# Patient Record
Sex: FEMALE | Race: WHITE | NOT HISPANIC OR LATINO | Employment: OTHER | URBAN - METROPOLITAN AREA
[De-identification: names, ages, dates, MRNs, and addresses within clinical notes are randomized per-mention and may not be internally consistent; named-entity substitution may affect disease eponyms.]

---

## 2017-09-22 ENCOUNTER — TELEPHONE (OUTPATIENT)
Dept: LAB | Facility: OTHER | Age: 62
End: 2017-09-22

## 2017-09-22 DIAGNOSIS — E78.5 HYPERLIPIDEMIA LDL GOAL <160: Primary | ICD-10-CM

## 2017-09-22 NOTE — TELEPHONE ENCOUNTER
Please enter future fasting lab orders for upcoming lab appointment, (pt. Also requesting Hep. C screening)    Thanks Jess

## 2017-09-22 NOTE — TELEPHONE ENCOUNTER
Lipid and glucose pended. Please let patient know that Hep C was completed last year and was negative. This does not need to be repeated.     Jamie Heck PA-C  [facility]

## 2017-09-22 NOTE — TELEPHONE ENCOUNTER
Lab notified order placed- and Lm for patient to call us back letting her know that does not need Hep C due to was done last year and was negative.

## 2017-09-27 ENCOUNTER — RADIANT APPOINTMENT (OUTPATIENT)
Dept: MAMMOGRAPHY | Facility: OTHER | Age: 62
End: 2017-09-27
Payer: COMMERCIAL

## 2017-09-27 DIAGNOSIS — Z12.31 VISIT FOR SCREENING MAMMOGRAM: ICD-10-CM

## 2017-09-27 DIAGNOSIS — E78.5 HYPERLIPIDEMIA LDL GOAL <160: ICD-10-CM

## 2017-09-27 LAB
CHOLEST SERPL-MCNC: 246 MG/DL
GLUCOSE SERPL-MCNC: 104 MG/DL (ref 70–99)
HDLC SERPL-MCNC: 79 MG/DL
LDLC SERPL CALC-MCNC: 151 MG/DL
NONHDLC SERPL-MCNC: 167 MG/DL
TRIGL SERPL-MCNC: 80 MG/DL

## 2017-09-27 PROCEDURE — 36415 COLL VENOUS BLD VENIPUNCTURE: CPT | Performed by: PHYSICIAN ASSISTANT

## 2017-09-27 PROCEDURE — G0202 SCR MAMMO BI INCL CAD: HCPCS | Mod: TC

## 2017-09-27 PROCEDURE — 80061 LIPID PANEL: CPT | Performed by: PHYSICIAN ASSISTANT

## 2017-09-27 PROCEDURE — 82947 ASSAY GLUCOSE BLOOD QUANT: CPT | Performed by: PHYSICIAN ASSISTANT

## 2017-09-29 ENCOUNTER — HEALTH MAINTENANCE LETTER (OUTPATIENT)
Age: 62
End: 2017-09-29

## 2017-10-10 NOTE — PROGRESS NOTES
SUBJECTIVE:   CC: Mirtha Marroquin is an 62 year old woman who presents for preventive health visit.     Physical   Annual:     Getting at least 3 servings of Calcium per day::  Yes    Bi-annual eye exam::  Yes    Dental care twice a year::  Yes    Sleep apnea or symptoms of sleep apnea::  None    Diet::  Regular (no restrictions)    Taking medications regularly::  Yes    Additional concerns today::  No    1. Ear pain, off and on for a few months  [facility], was given drops and helped but then left started hurting, especially when using ear plugs at work  - Has been using hydrocortisone on a q-tip which helps     2. Labs drawn 9/27/17   - Results     3. Yellowing and flaking of nails on big toe, spreading to 2nd toe on left  - Wears work boots   - Always bare foot at home   - Takes hot bath each night after work   - Hasn't tried anything OTC  - Been ongoing for year     4. Skin tag on left eyelid  - Bothers her   - Can't put on make up   - Had several removed from her lips in the past (frozen)         Today's PHQ-2 Score: PHQ-2 ( 1999 Pfizer) 9/22/2016   Q1: Little interest or pleasure in doing things 0   Q2: Feeling down, depressed or hopeless 0   PHQ-2 Score 0     Answers for HPI/ROS submitted by the patient on 10/16/2017   PHQ-2 Score: 0    Abuse: Current or Past(Physical, Sexual or Emotional)- No  Do you feel safe in your environment - Yes    Social History   Substance Use Topics     Smoking status: Former Smoker     Packs/day: 0.80     Years: 20.00     Types: Cigarettes     Quit date: 8/9/2009     Smokeless tobacco: Never Used     Alcohol use Yes      Comment: not much, beer once a month     The patient does not drink >3 drinks per day nor >7 drinks per week.    Reviewed orders with patient.  Reviewed health maintenance and updated orders accordingly - Yes  Labs reviewed in EPIC  BP Readings from Last 3 Encounters:   10/16/17 112/84   09/22/16 112/84   07/07/16 122/78    Wt  Readings from Last 3 Encounters:   10/16/17 227 lb (103 kg)   09/22/16 223 lb (101.2 kg)   07/07/16 228 lb 8 oz (103.6 kg)               The 10-year ASCVD risk score (Kandi MENDEZ Jr, et al., 2013) is: 3.3%    Values used to calculate the score:      Age: 62 years      Sex: Female      Is Non- : No      Diabetic: No      Tobacco smoker: No      Systolic Blood Pressure: 112 mmHg      Is BP treated: No      HDL Cholesterol: 79 mg/dL      Total Cholesterol: 246 mg/dL    Patient over age 50, mutual decision to screen reflected in health maintenance.      Pertinent mammograms are reviewed under the imaging tab.  History of abnormal Pap smear: NO - age 30-65 PAP every 5 years with negative HPV co-testing recommended    Reviewed and updated as needed this visit by clinical staffTobacco  Allergies  Meds  Problems  Med Hx  Surg Hx  Fam Hx  Soc Hx          Reviewed and updated as needed this visit by Provider  Allergies  Meds  Problems        Past Medical History:   Diagnosis Date     Abnormal weight gain 10/2009     Absence of menstruation 2002    LMP 2002     Chronic rhinitis      Dyspepsia and other specified disorders of function of stomach 5/2006     Osteopenia      Pure hypercholesterolemia     worse 2009     Tobacco use disorder     quit 2008      Past Surgical History:   Procedure Laterality Date     C DEXA INTERPRETATION, AXIAL  10/2004    WNL 12/2007 min change     C NONSPECIFIC PROCEDURE  1974    Gastric bypass     COLONOSCOPY  11/30/07    [facility]-repeat in 5yrs     COLONOSCOPY  12/17/12     [facility] COLONOSCOPY W/WO BRUSH/WASH  10/5/2004    tubular adenoma - repeat 3 years per pt (?)     HC REMV CATARACT EXTRACAP,INSERT LENS  1993       ROS:  C: NEGATIVE for fever, chills, change in weight  I: NEGATIVE for worrisome rashes, moles or lesions  E: NEGATIVE for vision changes or irritation  ENT: NEGATIVE for ear, mouth and throat problems  R: NEGATIVE for significant cough or SOB  B:  "NEGATIVE for masses, tenderness or discharge  CV: NEGATIVE for chest pain, palpitations or peripheral edema  GI: NEGATIVE for nausea, abdominal pain, heartburn, or change in bowel habits  : NEGATIVE for unusual urinary or vaginal symptoms. No vaginal bleeding.  M: NEGATIVE for significant arthralgias or myalgia  N: NEGATIVE for weakness, dizziness or paresthesias  P: NEGATIVE for changes in mood or affect      OBJECTIVE:   /84 (BP Location: Right arm, Patient Position: Chair, Cuff Size: Adult Regular)  Pulse 76  Temp 98.4  F (36.9  C) (Oral)  Resp 12  Ht 5' 4.88\" (1.648 m)  Wt 227 lb (103 kg)  LMP 03/15/2001  SpO2 98%  BMI 37.91 kg/m2  EXAM:  GENERAL APPEARANCE: healthy, alert and no distress  EYES: Eyes grossly normal to inspection, PERRL and conjunctivae and sclerae normal  HENT:Bilateral ear canals slightly erythematous with some scaling/white discharge, bilateral TM's normal, nose and mouth without ulcers or lesions, oropharynx clear and oral mucous membranes moist  NECK: no adenopathy, no asymmetry, masses, or scars and thyroid normal to palpation  RESP: lungs clear to auscultation - no rales, rhonchi or wheezes  BREAST: normal without masses, tenderness or nipple discharge and no palpable axillary masses or adenopathy  CV: regular rate and rhythm, normal S1 S2, no S3 or S4, no murmur, click or rub, no peripheral edema and peripheral pulses strong  ABDOMEN: soft, nontender, no hepatosplenomegaly, no masses and bowel sounds normal   (female): normal female external genitalia, normal urethral meatus, vaginal mucosal atrophy noted, normal cervix, adnexae, and uterus without masses or abnormal discharge  MS: no musculoskeletal defects are noted and gait is age appropriate without ataxia  SKIN: Skin tag on left upper eyelid 5 mm long with stalk        Bilateral toes - left 1st and 2nd nails, right 1st nails, thickened with yellowing        no other suspicious lesions or rashes  NEURO: Normal " strength and tone, sensory exam grossly normal, mentation intact and speech normal  PSYCH: mentation appears normal and affect normal/bright      Diagnostics  Results for orders placed or performed in visit on 09/27/17   MA Screening Digital Bilateral    Narrative    SCREENING MAMMOGRAM, BILATERAL, DIGITAL w/CAD - 9/27/2017 10:37 AM    BREAST SYMPTOMS: No current breast complaints.     COMPARISON:  10/31/2016, 10/19/2015, 12/01/2014, 11/04/2013 .    BREAST DENSITY: Scattered fibroglandular densities.    COMMENTS: No findings of suspicion for malignancy.       Impression    IMPRESSION: BI-RADS CATEGORY: 1 -  Negative    RECOMMENDED FOLLOW-UP: Annual Mammography.    Exam results letter mailed to patient.      JAMEY BADILLO MD     Results for DOMINGO JIMENEZ (MRN [id number]) as of 10/16/2017 10:42   Ref. Range 9/27/2017 09:56   Cholesterol Latest Ref Range: <200 mg/dL 246 (H)   HDL Cholesterol Latest Ref Range: >49 mg/dL 79   LDL Cholesterol Calculated Latest Ref Range: <100 mg/dL 151 (H)   Non HDL Cholesterol Latest Ref Range: <130 mg/dL 167 (H)   Triglycerides Latest Ref Range: <150 mg/dL 80   Glucose Latest Ref Range: 70 - 99 mg/dL 104 (H)         Procedure note:   Procedure was discussed with patient. Site(s) as described above were identified. Small amount of liquid nitrogen was poured from the canister into cup. Sterile swab was dipped into liquid nitrogen. The skin tag was pulled away from eyelid using forceps. The sterile swab was held against the stalk only of the skin tag for 2-3 seconds. 3 freeze-thaw cycles were given. A total of 1 skin tags were treated          ASSESSMENT/PLAN:       ICD-10-CM    1. Encounter for routine adult health examination without abnormal findings Z00.00    2. Hyperlipidemia LDL goal <160 E78.5    3. Screening for malignant neoplasm of cervix Z12.4 Pap imaged thin layer screen with HPV - recommended age 30 - 65 years (select HPV order below)     HPV High Risk Types DNA  Cervical   4. Screening for colon cancer Z12.11 GASTROENTEROLOGY ADULT REF PROCEDURE ONLY   5. Counseling regarding advanced directives Z71.89 HONORING CHOICES REFERRAL   6. Other infective acute otitis externa of both ears H60.393 ciprofloxacin-dexamethasone (CIPRODEX) otic suspension     OFFICE/OUTPT VISIT,EST,LEVL III   7. Tinea pedis of both feet B35.3 OFFICE/OUTPT VISIT,EST,LEVL III   8. Skin tag L91.8 OFFICE/OUTPT VISIT,EST,LEVL III     REMOVAL OF SKIN TAGS, FIRST 15   9. BMI >35 (H) E66.01      2. Lipids   - Monitoring yearly, no medications, patient states last year was taking fish oil consistently, but not so much this year  - ASCVD this year only at 3.3%   - Advised patient on lifestyle changes, diet, exercise, and weight loss  - Also advised taking Fish Oil consistently and discussed correct dosage (see patient instructions)   - Recheck yearly     3. Pap collected today, await results, may d/c paps if normal     4. Patient gets colonoscopies at [facility] in [place] as she lives in [facility] in December for her next one      Order placed     5. Discussed and gave hand out on this     6. Otitis Externa  - Not clear if inflammation vs irritative vs infectious   - Likely due to her ear plug use, no alternatives available as she is a  and uses a valdez that needs to go over her head   - Will do trial of Cipro Dex to clear possible infection and steroid to calm down, can repeat every 3 weeks, can continue with small amount of hydrocortisone on q-tip  - Patient planning on retiring next year so shouldn't be an issue much longer, will likely resolve when she stops using ear plugs     7. Dermatophytosis of nails on feet    - Mild case, no itching, just yellowing of nails   - Recommend OTC Lotrimin or like product, twice daily into nails  - Also extensively discussed good food care with nail trimming and keeping feet dry      Recommend boot dryer, inserts, powders to keep boots and feet dry during work  "  - Return to clinic if persists     8. Skin tag  - Treated on left upper eyelid   - Cryotherapy was very carefully applied just to stalk and no frost rim was seen going onto eyelid   - Prior to removal, discussed with patient can't use lidocaine or spray cryotherapy due to sensitive location   - Discussed this way of treating is safer, hopefully the skin tag will shrink and fall off, still discussed possible risks of blistering and infection  - Wash daily carefully with normal facial skin soap   - If doesn't resolve, may just have to freeze stalk again and use iris scissors to snip off   - After care discussed     - Discussed mammograms now every 2 years due to no family history and many years of normal mammograms           COUNSELING:  Reviewed preventive health counseling, as reflected in patient instructions  Special attention given to:        Regular exercise       Healthy diet/nutrition       Advance Care Planning     reports that she quit smoking about 8 years ago. Her smoking use included Cigarettes. She has a 16.00 pack-year smoking history. She has never used smokeless tobacco.    Estimated body mass index is 37.2 kg/(m^2) as calculated from the following:    Height as of 5/23/16: 5' 4.92\" (1.649 m).    Weight as of 9/22/16: 223 lb (101.2 kg).   Weight management plan: Discussed healthy diet and exercise guidelines and patient will follow up in 12 months in clinic to re-evaluate.    Counseling Resources:  ATP IV Guidelines  Pooled Cohorts Equation Calculator  Breast Cancer Risk Calculator  FRAX Risk Assessment  ICSI Preventive Guidelines  Dietary Guidelines for Americans, 2010  USDA's MyPlate  ASA Prophylaxis  Lung CA Screening      In addition to the preventive visit, 20 minutes of the appointment were spent evaluating and developing a treatment plan for her additional concern(s).        Jeni Heck PA-C  [facility]"

## 2017-10-16 ENCOUNTER — OFFICE VISIT (OUTPATIENT)
Dept: FAMILY MEDICINE | Facility: OTHER | Age: 62
End: 2017-10-16
Payer: COMMERCIAL

## 2017-10-16 VITALS
RESPIRATION RATE: 12 BRPM | TEMPERATURE: 98.4 F | SYSTOLIC BLOOD PRESSURE: 112 MMHG | BODY MASS INDEX: 37.82 KG/M2 | OXYGEN SATURATION: 98 % | WEIGHT: 227 LBS | DIASTOLIC BLOOD PRESSURE: 84 MMHG | HEART RATE: 76 BPM | HEIGHT: 65 IN

## 2017-10-16 DIAGNOSIS — H60.393 OTHER INFECTIVE ACUTE OTITIS EXTERNA OF BOTH EARS: ICD-10-CM

## 2017-10-16 DIAGNOSIS — L91.8 SKIN TAG: ICD-10-CM

## 2017-10-16 DIAGNOSIS — Z12.4 SCREENING FOR MALIGNANT NEOPLASM OF CERVIX: ICD-10-CM

## 2017-10-16 DIAGNOSIS — B35.3 TINEA PEDIS OF BOTH FEET: ICD-10-CM

## 2017-10-16 DIAGNOSIS — E78.5 HYPERLIPIDEMIA LDL GOAL <160: ICD-10-CM

## 2017-10-16 DIAGNOSIS — Z12.11 SCREENING FOR COLON CANCER: ICD-10-CM

## 2017-10-16 DIAGNOSIS — Z00.00 ENCOUNTER FOR ROUTINE ADULT HEALTH EXAMINATION WITHOUT ABNORMAL FINDINGS: Primary | ICD-10-CM

## 2017-10-16 DIAGNOSIS — Z71.89 COUNSELING REGARDING ADVANCED DIRECTIVES: ICD-10-CM

## 2017-10-16 DIAGNOSIS — E66.01 MORBID OBESITY (H): ICD-10-CM

## 2017-10-16 PROCEDURE — G0145 SCR C/V CYTO,THINLAYER,RESCR: HCPCS | Performed by: PHYSICIAN ASSISTANT

## 2017-10-16 PROCEDURE — 99213 OFFICE O/P EST LOW 20 MIN: CPT | Mod: 25 | Performed by: PHYSICIAN ASSISTANT

## 2017-10-16 PROCEDURE — 11200 RMVL SKIN TAGS UP TO&INC 15: CPT | Performed by: PHYSICIAN ASSISTANT

## 2017-10-16 PROCEDURE — 87624 HPV HI-RISK TYP POOLED RSLT: CPT | Performed by: PHYSICIAN ASSISTANT

## 2017-10-16 PROCEDURE — 99396 PREV VISIT EST AGE 40-64: CPT | Mod: 25 | Performed by: PHYSICIAN ASSISTANT

## 2017-10-16 RX ORDER — CIPROFLOXACIN AND DEXAMETHASONE 3; 1 MG/ML; MG/ML
4 SUSPENSION/ DROPS AURICULAR (OTIC) 2 TIMES DAILY
Qty: 7.5 ML | Refills: 0 | Status: SHIPPED | OUTPATIENT
Start: 2017-10-16 | End: 2017-10-23

## 2017-10-16 NOTE — PATIENT INSTRUCTIONS
- OTC Lotrimin (anti-fungal) for feet     - Ciprodex drops - 4 drops twice a day for 7 days, then stop for 2 weeks, if returns can repeat every 3 weeks         I recommend that you take Omega-3 fatty acids (available in capsules) to improve your cholesterol. You need 2000 - 4000 mg daily of EPA + DHA. This usually means 4 - 8 capsules a day, depending on the strength you buy OR Or you can try taking red yeast rice (an herbal supplement that contains a natural statin) start with 600 mg once daily and increase to 1200 mg twice daily as tolerated.     As you may know, abnormal cholesterol is one factor that increases your risk for heart disease and stroke. You can improve your cholesterol by controlling the amount and type of fat you eat and by increasing your daily activity level.    Here are some ways to improve your nutrition (adapted from the American Academy of Family Practice handout):  Eat less fat (especially butter, Crisco and other saturated fats)  Buy lean cuts of meat; reduce your portions of red meat or substitute poultry or fish  Use skim milk and low-fat dairy products  Eat no more than 4 egg yolks per week  Avoid fried or fast foods that are high in fat  Eat more fruits and vegetables    Also consider starting or increasing your aerobic activity; this is the best way to improve HDL (good) cholesterol. If aerobic activity would be new to you, please talk with me first about what activities are safe for you.

## 2017-10-16 NOTE — MR AVS SNAPSHOT
After Visit Summary   10/16/2017    Mirtha Marroquin    MRN: [id number]           Patient Information     Date Of Birth          1955        Visit Information        Provider Department      10/16/2017 10:45 AM Jeni Heck PA-C [facility]        Today's Diagnoses     Encounter for routine adult health examination without abnormal findings    -  1    Hyperlipidemia LDL goal <160        Screening for malignant neoplasm of cervix        Screening for colon cancer        Counseling regarding advanced directives        Other infective acute otitis externa of both ears          Care Instructions      - OTC Lotrimin (anti-fungal) for feet     - Ciprodex drops - 4 drops twice a day for 7 days, then stop for 2 weeks, if returns can repeat every 3 weeks         I recommend that you take Omega-3 fatty acids (available in capsules) to improve your cholesterol. You need 2000 - 4000 mg daily of EPA + DHA. This usually means 4 - 8 capsules a day, depending on the strength you buy OR Or you can try taking red yeast rice (an herbal supplement that contains a natural statin) start with 600 mg once daily and increase to 1200 mg twice daily as tolerated.     As you may know, abnormal cholesterol is one factor that increases your risk for heart disease and stroke. You can improve your cholesterol by controlling the amount and type of fat you eat and by increasing your daily activity level.    Here are some ways to improve your nutrition (adapted from the American Academy of Family Practice handout):  Eat less fat (especially butter, Crisco and other saturated fats)  Buy lean cuts of meat; reduce your portions of red meat or substitute poultry or fish  Use skim milk and low-fat dairy products  Eat no more than 4 egg yolks per week  Avoid fried or fast foods that are high in fat  Eat more fruits and vegetables    Also consider starting or increasing your aerobic activity; this is  the best way to improve HDL (good) cholesterol. If aerobic activity would be new to you, please talk with me first about what activities are safe for you.                  Follow-ups after your visit        Additional Services     GASTROENTEROLOGY ADULT REF PROCEDURE ONLY       Last Lab Result: Creatinine (mg/dL)       Date                     Value                 11/02/2012               0.84             ----------  Body mass index is 37.91 kg/(m^2).     Needed:  No  Language:  English    Patient will be contacted to schedule procedure.     Please be aware that coverage of these services is subject to the terms and limitations of your health insurance plan.  Call member services at your health plan with any benefit or coverage questions.  Any procedures must be performed at a [facility] facility OR coordinated by your clinic's referral office.    Please bring the following with you to your appointment:    (1) Any X-Rays, CTs or MRIs which have been performed.  Contact the facility where they were done to arrange for  prior to your scheduled appointment.    (2) List of current medications   (3) This referral request   (4) Any documents/labs given to you for this referral            [facility] REFERRAL       Your provider has referred you to [facility] Advance Care Planning Facilitator or Serious illness clinic support staff. The facilitator or support staff will contact you to schedule the appointment or for the follow up call    Reason for Referral: Basic Advance Care Planning - 1:1 need                  Follow-up notes from your care team     Return in about 1 year (around 10/16/2018).      Who to contact     If you have questions or need follow up information about today's clinic visit or your schedule please contact [facility] directly at [phone].  Normal or non-critical lab and imaging results will be communicated to you by MyChart, letter or phone  "within 4 business days after the clinic has received the results. If you do not hear from us within 7 days, please contact the clinic through Meiyou or phone. If you have a critical or abnormal lab result, we will notify you by phone as soon as possible.  Submit refill requests through Meiyou or call your pharmacy and they will forward the refill request to us. Please allow 3 business days for your refill to be completed.          Additional Information About Your Visit        Meiyou Information     Meiyou lets you send messages to your doctor, view your test results, renew your prescriptions, schedule appointments and more. To sign up, go to [email or web address] . Click on \"Log in\" on the left side of the screen, which will take you to the Welcome page. Then click on \"Sign up Now\" on the right side of the page.     You will be asked to enter the access code listed below, as well as some personal information. Please follow the directions to create your username and password.     Your access code is: [email or web address]  Expires: 2018 11:07 AM     Your access code will  in 90 days. If you need help or a new code, please call your [facility] clinic or [phone].        Care EveryWhere ID     This is your Care EveryWhere ID. This could be used by other organizations to access your [facility] medical records  [id number]        Your Vitals Were     Pulse Temperature Respirations Height Last Period Pulse Oximetry    76 98.4  F (36.9  C) (Oral) 12 5' 4.88\" (1.648 m) 03/15/2001 98%    BMI (Body Mass Index)                   37.91 kg/m2            Blood Pressure from Last 3 Encounters:   10/16/17 112/84   16 112/84   16 122/78    Weight from Last 3 Encounters:   10/16/17 227 lb (103 kg)   16 223 lb (101.2 kg)   16 228 lb 8 oz (103.6 kg)              We Performed the Following     GASTROENTEROLOGY ADULT REF PROCEDURE ONLY     HONORING CHOICES REFERRAL     HPV High Risk Types DNA Cervical  "    Pap imaged thin layer screen with HPV - recommended age 30 - 65 years (select HPV order below)          Today's Medication Changes          These changes are accurate as of: 10/16/17 11:07 AM.  If you have any questions, ask your nurse or doctor.               Start taking these medicines.        Dose/Directions    ciprofloxacin-dexamethasone otic suspension   Commonly known as:  CIPRODEX   Used for:  Other infective acute otitis externa of both ears   Started by:  Jeni Heck PA-C        Dose:  4 drop   Place 4 drops into both ears 2 times daily for 7 days   Quantity:  7.5 mL   Refills:  0            Where to get your medicines      These medications were sent to [facility] [place] - [address]  [address] [address]     Phone:  [phone]     ciprofloxacin-dexamethasone otic suspension                Primary Care Provider Office Phone # Fax #    Jeni Heck PA-C [phone]       [address]  [facility] [address]        Equal Access to Services     [facility] CenterROSE MARIE : Hadii eleni florentino hadasho Soomaali, waaxda luqadaha, qaybta kaalmada adeegyada, waxay addie kang . So [facility] [phone].    ATENCIÓN: Si habla español, tiene a vallecillo disposición servicios gratuitos de asistencia lingüística. Llame al [phone].    We comply with applicable federal civil rights laws and [place] laws. We do not discriminate on the basis of race, color, national origin, age, disability, sex, sexual orientation, or gender identity.            Thank you!     Thank you for choosing [facility]  for your care. Our goal is always to provide you with excellent care. Hearing back from our patients is one way we can continue to improve our services. Please take a few minutes to complete the written survey that you may receive in the mail after your visit with us. Thank you!             Your Updated  Medication List - Protect others around you: Learn how to safely use, store and throw away your medicines at www.disposemymeds.org.          This list is accurate as of: 10/16/17 11:07 AM.  Always use your most recent med list.                   Brand Name Dispense Instructions for use Diagnosis    ciprofloxacin-dexamethasone otic suspension    CIPRODEX    7.5 mL    Place 4 drops into both ears 2 times daily for 7 days    Other infective acute otitis externa of both ears       FISH OIL           GLUCOSAMINE SULFATE PO           Iron 25 MG Tabs      Take  by mouth.        LUTEIN PO           MAGNESIUM OXIDE PO           Multi-vitamin Tabs tablet   Generic drug:  multivitamin, therapeutic with minerals           PROBIOTIC DAILY PO      Take 1 tablet by mouth        VITAMIN B COMPLEX PO      One daily        VITAMIN D PO      one daily        ZYRTEC 10 MG tablet   Generic drug:  cetirizine     0    ONE TABLET DAILY    Chronic rhinitis

## 2017-10-16 NOTE — LETTER
October 24, 2017    Mirtha Marroquin  [address] [place] [address]    Dear Mirtha,  We are happy to inform you that your PAP smear result from 10/16/17 is normal.  We are now able to do a follow up test on PAP smears. The DNA test is for HPV (Human Papilloma Virus). Cervical cancer is closely linked with certain types of HPV. Your result showed no evidence of high risk HPV.  Therefore we recommend you return in 5 years for your next pap smear and HPV test.  You will still need to return to the clinic every year for an annual exam and other preventive tests.  Please contact the clinic at [phone] with any questions.  Sincerely,    Jeni Heck PA-C/jocelin

## 2017-10-16 NOTE — NURSING NOTE
"Chief Complaint   Patient presents with     Physical     Panel Management     mychart, height, flu, pap, honoring choices       Initial /84 (BP Location: Right arm, Patient Position: Chair, Cuff Size: Adult Regular)  Pulse 76  Temp 98.4  F (36.9  C) (Oral)  Resp 12  Ht 5' 4.88\" (1.648 m)  Wt 227 lb (103 kg)  LMP 03/15/2001  SpO2 98%  BMI 37.91 kg/m2 Estimated body mass index is 37.91 kg/(m^2) as calculated from the following:    Height as of this encounter: 5' 4.88\" (1.648 m).    Weight as of this encounter: 227 lb (103 kg).  Medication Reconciliation: complete  "

## 2017-10-19 LAB
COPATH REPORT: NORMAL
PAP: NORMAL

## 2017-10-20 ENCOUNTER — TELEPHONE (OUTPATIENT)
Dept: FAMILY MEDICINE | Facility: OTHER | Age: 62
End: 2017-10-20

## 2017-10-20 DIAGNOSIS — Z12.11 ENCOUNTER FOR SCREENING COLONOSCOPY: Primary | ICD-10-CM

## 2017-10-20 NOTE — TELEPHONE ENCOUNTER
Patient called in stating that [facility] does do colonoscopies and would like a referral sent there for her to have the procedure. Please send referral. For any further questions, please contact patient.

## 2017-10-20 NOTE — TELEPHONE ENCOUNTER
Spoke to patient and informed her we will fax order to [facility] in [place] phone is [phone] Fax number [phone]  I will put on my cover sheet to call patient to set up her colonoscopy.

## 2017-10-23 LAB
FINAL DIAGNOSIS: NORMAL
HPV HR 12 DNA CVX QL NAA+PROBE: NEGATIVE
HPV16 DNA SPEC QL NAA+PROBE: NEGATIVE
HPV18 DNA SPEC QL NAA+PROBE: NEGATIVE
SPECIMEN DESCRIPTION: NORMAL

## 2017-10-24 ENCOUNTER — TELEPHONE (OUTPATIENT)
Dept: FAMILY MEDICINE | Facility: OTHER | Age: 62
End: 2017-10-24

## 2017-10-24 NOTE — TELEPHONE ENCOUNTER
Reason for Call:  Same Day Appointment, Requested Provider:  Any     PCP: Jeni Heck    Reason for visit: poss UTI    Duration of symptoms: last night    Have you been treated for this in the past? Yes, in the past    Additional comments: patient does work tonight at 3 pm and is hoping to be worked in prior to that time     Can we leave a detailed message on this number? YES    Phone number patient can be reached at: Home number on file [phone] (home)    Best Time: any    Call taken on 10/24/2017 at 12:23 PM by Malia Mancilla

## 2017-10-24 NOTE — TELEPHONE ENCOUNTER
LM for patient to return call.   No appts available prior to requested time.   Can try [facility] depending on severity of symptoms, UC, or appt tomorrow.     Halima Haq, RN, BSN

## 2017-10-24 NOTE — TELEPHONE ENCOUNTER
Phone busy to inform I faxed [facility] to call her to schedule her colonoscopy. Will try again later.

## 2017-10-24 NOTE — TELEPHONE ENCOUNTER
Reason for Call: Request for an order or referral:    Order or referral being requested: colonoscopy    Date needed: as soon as possible    Has the patient been seen by the PCP for this problem? YES    Additional comments: was told order was sent to [facility] but when she called to schedule they did not have an order. Please don't fax. Please call them at [phone]     Phone number Patient can be reached at:  Home number on file [phone] (home)    Best Time:  any    Can we leave a detailed message on this number?  YES    Call taken on 10/24/2017 at 10:35 AM by Alison Jensen

## 2017-10-24 NOTE — TELEPHONE ENCOUNTER
Patient was referred to have colonoscopy and was going to go to [facility] but they are full into next year. She recalls one over by [facility]? She thought [place]? I was not familiar with this but said I would check to see if we could refer her here? She said she would call to schedule but she isn't sure where to go or what this place was called.

## 2017-12-04 ENCOUNTER — TRANSFERRED RECORDS (OUTPATIENT)
Dept: HEALTH INFORMATION MANAGEMENT | Facility: CLINIC | Age: 62
End: 2017-12-04

## 2018-05-09 NOTE — PROGRESS NOTES
SUBJECTIVE:   Mirtha Marroquin is a 62 year old female who presents to clinic today for the following health issues:    HPI  Acute Illness   Acute illness concerns: ear pain  Onset: months    Fever: no    Chills/Sweats: no    Headache (location?): no    Sinus Pressure:YES    Conjunctivitis:  no    Ear Pain: YES: both - drainage     Rhinorrhea: no    Congestion: no    Sore Throat: no     Cough: YES-non-productive    Wheeze: YES    Decreased Appetite: no     Nausea: no    Vomiting: no    Diarrhea:  YES- sometimes but taking probiotics for it    Dysuria/Freq.: no    Fatigue/Achiness: YES- knees    Sick/Strep Exposure: no     Therapies Tried and outcome: got new ear plugs at [employer] and states that is helping  Sores on the inside, drainage is sticky but clear - cleaning with q-tips.  Using hydrogen peroxide to clean ears      Joint Pain - right shoulder pain     Onset: ongoing for years, on and off    Description:   Location: right shoulder  Character: Dull ache    Intensity: mild, moderate    Progression of Symptoms: worse    Accompanying Signs & Symptoms:  Other symptoms: numbness and tingling more so at night    History:   Previous similar pain: off and on throughout the years      Precipitating factors:   Trauma or overuse: YES- at work    Alleviating factors:  Improved by: Ibuprofen  Therapies Tried and outcome:     - Can't put on bra   - Pain mostly front side   - Is a , 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  Additional history: as documented    ROS:  Constitutional, HEENT, cardiovascular, pulmonary, gi and gu systems are negative, except as otherwise noted.    OBJECTIVE:   /76 (BP Location: Left arm, Patient Position: Chair, Cuff Size: Adult Large)  Pulse 81  Temp 97.7  F (36.5  C) (Oral)   "Resp 16   5' 4.76\" (1.645 m)  Wt 227 lb (103 kg)  LMP 03/15/2001  SpO2 97%  BMI 38.05 kg/m2  Body mass index is 38.05 kg/(m^2).  GENERAL APPEARANCE: ill appearing, alert and no distress  EYES: Eyes grossly normal to inspection, PERRLA, conjunctivae and sclerae without injection or discharge, EOM intact, skin tag on left upper eyelid slightly lateral of midline and along eyelash border   HENT: Bilateral ear canals extremely erythematous with mild edema and white/yellow and some clear exudate (not cerumen), no open sores, diffuse dry skin to exterior portion of canal, left TM pearly grey with normal light reflex, small clear serous effusion with injection and no bulging, right TM with purulent effusion and injection, no bulging, nasal turbinates without swelling, erythema, or discharge, mouth without ulcers or lesions, oropharynx clear and oral mucous membranes moist, no sinus tenderness   NECK: No adenopathy in cervical or supraclavicular regions  RESP: Lungs clear to auscultation - no rales, rhonchi or wheezes   CV: Regular rates and rhythm, normal S1 S2, no S3 or S4, no murmur, click or rub  MS: No musculoskeletal defects are noted and gait is age appropriate without ataxia           Right shoulder pain: Slightly decreased ROM with flexion and external rotation, tender, no edema, CMS intact, normal sensory exam, normal strength, positive Neer's (only at top of ROM), positive Hawking's, positive lift off, negative belly press          Right elbow: Normal ROM, non-tender, no edema, CMS intact, normal sensory exam, normal strength           Left shoulder: Normal ROM, non-tender, no edema, CMS intact, normal sensory exam, normal strength  SKIN: No suspicious lesions or rashes, hydration status appears adeuqate with normal skin turgor   PSYCH: Alert and oriented x3; speech- coherent , normal rate and volume; able to articulate logical thoughts, able to abstract reason, no tangential thoughts, no hallucinations or " delusions, mentation appears normal, Mood is euthymic. Affect is appropriate for this mood state and bright. Thought content is free of suicidal ideation, hallucinations, and delusions. Dress is adequate and upkept. Eye contact is good during conversation.       Diagnostic Test Results:  none       Procedure Note:  Pause for the cause has been completed prior to the prceedure.   1. Patient was identified by both name and date of birth   2. The correct site was identified   3. Site was marked by provider    4. Written informed consent correct and signed or verbal authorization  to proceed was obtained   5. Verifed necessary supplies, equipment, and diagnostics are available    6. Time out was performed immediately prior to procedure    Objective: The skin tag is of the above mentioned size and location and is/are typical. Using the usual technique, a hemostat was applied to stalk for 30-45 seconds, then a snip excision with an iris scissors was performed. Hemostasis was not needed. No bleeding was present. No dressing was applied. The procedure was well tolerated and without complications.        ASSESSMENT/PLAN:       ICD-10-CM    1. Acute suppurative otitis media of right ear without spontaneous rupture of tympanic membrane, recurrence not specified H66.001 azithromycin (ZITHROMAX) 250 MG tablet   2. Acute otitis externa of both ears, unspecified type H60.503 ciprofloxacin-dexamethasone (CIPRODEX) otic suspension   3. Acute pain of right shoulder M25.511 ORTHO  REFERRAL   4. Skin tag L91.8 REMOVAL OF SKIN TAGS, FIRST 15     1 & 2.   - Discussed findings as above, infection external and internally   - Discussed antibiotic use, duration, and side effects  - Discussed antibiotic drops as well   - Needs to stop putting anything in ears   - Use vaseline or hypoallergenic lotion for dry skin EXTERNALLY to ear canal     3. Shoulder   - Question impingement vs. Rotator cuff vs OA    - Recommend orthopedics for  further evaluation     4. Skin tag  - Removed as above after discussing risks of removal   - After care discussed, wash daily with soap and water, watch for signs of infection, and bleeding     The patient indicates understanding of these issues and agrees with the plan.    Follow up: PRN and with specialist       Jeni Heck PA-C  [facility]

## 2018-05-11 ENCOUNTER — OFFICE VISIT (OUTPATIENT)
Dept: FAMILY MEDICINE | Facility: OTHER | Age: 63
End: 2018-05-11
Payer: COMMERCIAL

## 2018-05-11 VITALS
OXYGEN SATURATION: 97 % | WEIGHT: 227 LBS | HEIGHT: 65 IN | HEART RATE: 81 BPM | SYSTOLIC BLOOD PRESSURE: 112 MMHG | TEMPERATURE: 97.7 F | BODY MASS INDEX: 37.82 KG/M2 | RESPIRATION RATE: 16 BRPM | DIASTOLIC BLOOD PRESSURE: 76 MMHG

## 2018-05-11 DIAGNOSIS — H60.503 ACUTE OTITIS EXTERNA OF BOTH EARS, UNSPECIFIED TYPE: ICD-10-CM

## 2018-05-11 DIAGNOSIS — H66.001 ACUTE SUPPURATIVE OTITIS MEDIA OF RIGHT EAR WITHOUT SPONTANEOUS RUPTURE OF TYMPANIC MEMBRANE, RECURRENCE NOT SPECIFIED: Primary | ICD-10-CM

## 2018-05-11 DIAGNOSIS — M25.511 ACUTE PAIN OF RIGHT SHOULDER: ICD-10-CM

## 2018-05-11 DIAGNOSIS — L91.8 SKIN TAG: ICD-10-CM

## 2018-05-11 PROCEDURE — 11200 RMVL SKIN TAGS UP TO&INC 15: CPT | Performed by: PHYSICIAN ASSISTANT

## 2018-05-11 PROCEDURE — 99214 OFFICE O/P EST MOD 30 MIN: CPT | Mod: 25 | Performed by: PHYSICIAN ASSISTANT

## 2018-05-11 RX ORDER — AZITHROMYCIN 250 MG/1
TABLET, FILM COATED ORAL
Qty: 6 TABLET | Refills: 0 | Status: SHIPPED | OUTPATIENT
Start: 2018-05-11 | End: 2019-03-22

## 2018-05-11 RX ORDER — CIPROFLOXACIN AND DEXAMETHASONE 3; 1 MG/ML; MG/ML
4 SUSPENSION/ DROPS AURICULAR (OTIC) 2 TIMES DAILY
Qty: 7.5 ML | Refills: 0 | Status: SHIPPED | OUTPATIENT
Start: 2018-05-11 | End: 2018-05-18

## 2018-05-11 NOTE — NURSING NOTE
"Chief Complaint   Patient presents with     Musculoskeletal Problem     right shoulder pain     Ear Problem     Panel Management     HIV, MyChart, Height       Initial /76 (BP Location: Left arm, Patient Position: Chair, Cuff Size: Adult Large)  Pulse 81  Temp 97.7  F (36.5  C) (Oral)  Resp 16  Ht 5' 4.76\" (1.645 m)  Wt 227 lb (103 kg)  LMP 03/15/2001  SpO2 97%  BMI 38.05 kg/m2 Estimated body mass index is 38.05 kg/(m^2) as calculated from the following:    Height as of this encounter: 5' 4.76\" (1.645 m).    Weight as of this encounter: 227 lb (103 kg).  Medication Reconciliation: complete  "

## 2018-05-11 NOTE — MR AVS SNAPSHOT
After Visit Summary   5/11/2018    Mirtha Marroquin    MRN: [id number]           Patient Information     Date Of Birth          1955        Visit Information        Provider Department      5/11/2018 12:00 PM Jein Heck PA-C [facility]        Today's Diagnoses     Acute suppurative otitis media of right ear without spontaneous rupture of tympanic membrane, recurrence not specified    -  1    Acute otitis externa of both ears, unspecified type        Acute pain of right shoulder          Care Instructions      1. Oral antibiotic      Azithromycin - 2 tablets today, then 1 tablet for 4 days (total 5 days)    2. Topical antibiotic - ear drops       4 drops (each ear) twice a day for 7 days       Only use Vaseline for moisture     3. They will call you to schedule with orthopedics               Follow-ups after your visit        Additional Services     ORTHO  REFERRAL       [facility] is referring you to the Orthopedic  Services at [facility].       The  Representative will assist you in the coordination of your Orthopedic and Musculoskeletal Care as prescribed by your physician.    The  Representative will call you within 1 business day to help schedule your appointment, or you may contact the  Representative at:    All areas ~ [phone]     Type of Referral : Surgical / Specialist       Timeframe requested: Routine    Coverage of these services is subject to the terms and limitations of your health insurance plan.  Please call member services at your health plan with any benefit or coverage questions.      If X-rays, CT or MRI's have been performed, please contact the facility where they were done to arrange for , prior to your scheduled appointment.  Please bring this referral request to your appointment and present it to your specialist.                  Follow-up  "notes from your care team     Return if symptoms worsen or fail to improve.      Who to contact     If you have questions or need follow up information about today's clinic visit or your schedule please contact [facility] directly at [phone].  Normal or non-critical lab and imaging results will be communicated to you by MyChart, letter or phone within 4 business days after the clinic has received the results. If you do not hear from us within 7 days, please contact the clinic through [employer] or phone. If you have a critical or abnormal lab result, we will notify you by phone as soon as possible.  Submit refill requests through [employer] or call your pharmacy and they will forward the refill request to us. Please allow 3 business days for your refill to be completed.          Additional Information About Your Visit        [employer] Information     [employer] lets you send messages to your doctor, view your test results, renew your prescriptions, schedule appointments and more. To sign up, go to [email or web address] . Click on \"Log in\" on the left side of the screen, which will take you to the Welcome page. Then click on \"Sign up Now\" on the right side of the page.     You will be asked to enter the access code listed below, as well as some personal information. Please follow the directions to create your username and password.     Your access code is: [email or web address]  Expires: 2018 12:13 PM     Your access code will  in 90 days. If you need help or a new code, please call your [facility] or [phone].        Care EveryWhere ID     This is your Care EveryWhere ID. This could be used by other organizations to access your [place] medical records  [id number]        Your Vitals Were     Pulse Temperature Respirations Height Last Period Pulse Oximetry    81 97.7  F (36.5  C) (Oral) 16 5' 4.76\" (1.645 m) 03/15/2001 97%    BMI (Body Mass Index)                   38.05 kg/m2            Blood " Pressure from Last 3 Encounters:   05/11/18 112/76   10/16/17 112/84   09/22/16 112/84    Weight from Last 3 Encounters:   05/11/18 227 lb (103 kg)   10/16/17 227 lb (103 kg)   09/22/16 223 lb (101.2 kg)              We Performed the Following     ORTHO  REFERRAL          Today's Medication Changes          These changes are accurate as of 5/11/18 12:13 PM.  If you have any questions, ask your nurse or doctor.               Start taking these medicines.        Dose/Directions    azithromycin 250 MG tablet   Commonly known as:  ZITHROMAX   Used for:  Acute suppurative otitis media of right ear without spontaneous rupture of tympanic membrane, recurrence not specified   Started by:  Jeni Heck PA-C        Two tablets first day, then one tablet daily for four days.   Quantity:  6 tablet   Refills:  0       ciprofloxacin-dexamethasone otic suspension   Commonly known as:  CIPRODEX   Used for:  Acute otitis externa of both ears, unspecified type   Started by:  Jeni Heck PA-C        Dose:  4 drop   Place 4 drops into both ears 2 times daily for 7 days   Quantity:  7.5 mL   Refills:  0            Where to get your medicines      These medications were sent to [facility] [place] [id number]     Phone:  [phone]     azithromycin 250 MG tablet    ciprofloxacin-dexamethasone otic suspension                Primary Care Provider Office Phone # Fax #    Jeni Heck PA-C [phone]       [address] [place] [id number]        Equal Access to Services     [facility] AH: Hadii eleni donato Somickey, waaxda luqadaha, qaybta kaalmachristin sheridan, bina samson. So [facility] [phone].    ATENCIÓN: Si habla español, tiene a vallecillo disposición servicios gratuitos de asistencia lingüística. Llame al [phone].    We comply with applicable federal  civil rights laws and [place] laws. We do not discriminate on the basis of race, color, national origin, age, disability, sex, sexual orientation, or gender identity.            Thank you!     Thank you for choosing [facility]  for your care. Our goal is always to provide you with excellent care. Hearing back from our patients is one way we can continue to improve our services. Please take a few minutes to complete the written survey that you may receive in the mail after your visit with us. Thank you!             Your Updated Medication List - Protect others around you: Learn how to safely use, store and throw away your medicines at www.disposemymeds.org.          This list is accurate as of 5/11/18 12:13 PM.  Always use your most recent med list.                   Brand Name Dispense Instructions for use Diagnosis    azithromycin 250 MG tablet    ZITHROMAX    6 tablet    Two tablets first day, then one tablet daily for four days.    Acute suppurative otitis media of right ear without spontaneous rupture of tympanic membrane, recurrence not specified       ciprofloxacin-dexamethasone otic suspension    CIPRODEX    7.5 mL    Place 4 drops into both ears 2 times daily for 7 days    Acute otitis externa of both ears, unspecified type       FISH OIL           GLUCOSAMINE SULFATE PO           Iron 25 MG Tabs      Take  by mouth.        LUTEIN PO           MAGNESIUM OXIDE PO           Multi-vitamin Tabs tablet   Generic drug:  multivitamin, therapeutic with minerals           PROBIOTIC DAILY PO      Take 1 tablet by mouth        VITAMIN B COMPLEX PO      One daily        VITAMIN D PO      one daily        ZYRTEC 10 MG tablet   Generic drug:  cetirizine     0    ONE TABLET DAILY    Chronic rhinitis

## 2018-05-15 NOTE — PROGRESS NOTES
ORTHOPEDIC CONSULT      Chief Complaint: Mirtha Marroquin is a 62 year old right hand dominant female who works as a .        She is being seen for   Chief Complaints and History of Present Illnesses   Patient presents with     Consult     rt shoulder pain per Jeni Heck PA-C         History of Present Illness:   Mirtha Marroquin is a 62 year old female who is seen in consultation at the request of Jeni Heck PA-C.  History of Present illness:  Mirtha presents for evaluation of:  1.) rt shoulder  Onset: off and on for years   Symptoms brought on by: over use at work .   Character:  stabbing and radiation of pain numbness into hand at times.    Progression of symptoms:  same.    Previous similar pain: YES.   Pain Level:  0/10.   Previous treatments:  nothing and Ibuprofen.  Currently on Blood thinners? No  Diagnosis of Diabetes? No  Intermittent for a long time  Decreased ROM, weakness, anterior shoulder pain  Sometimes she sleeps on the arm and it falls asleep      Patient's past medical, surgical, social and family histories reviewed.       Past Medical History:   Diagnosis Date     Abnormal weight gain 10/2009     Absence of menstruation 2002    LMP 2002     Chronic rhinitis      Dyspepsia and other specified disorders of function of stomach 5/2006     Osteopenia      Pure hypercholesterolemia     worse 2009     Tobacco use disorder     quit 2008         Past Surgical History:   Procedure Laterality Date     C DEXA INTERPRETATION, AXIAL  10/2004    WNL 12/2007 min change     C NONSPECIFIC PROCEDURE  1974    Gastric bypass     COLONOSCOPY  11/30/07    [facility]-repeat in 5yrs     COLONOSCOPY  12/17/12     [facility] COLONOSCOPY W/WO BRUSH/WASH  10/5/2004    tubular adenoma - repeat 3 years per pt (?)     [facility] REMV CATARACT EXTRACAP,INSERT LENS  1993         Medications:    Current Outpatient Prescriptions on File Prior to Visit:  azithromycin (ZITHROMAX) 250  "MG tablet Two tablets first day, then one tablet daily for four days.   ciprofloxacin-dexamethasone (CIPRODEX) otic suspension Place 4 drops into both ears 2 times daily for 7 days   FISH OIL    GLUCOSAMINE SULFATE PO    Iron 25 MG TABS Take  by mouth.   LUTEIN PO    MAGNESIUM OXIDE PO    MULTI-VITAMIN OR TABS    Probiotic Product (PROBIOTIC DAILY PO) Take 1 tablet by mouth   VITAMIN B COMPLEX OR One daily   VITAMIN D OR one daily   ZYRTEC 10 MG OR TABS ONE TABLET DAILY     No current facility-administered medications on file prior to visit.       Allergies   Allergen Reactions     No Known Drug Allergies          Social History     Occupational History      [employer]     Social History Main Topics     Smoking status: Former Smoker     Packs/day: 0.80     Years: 20.00     Types: Cigarettes     Quit date: 2009     Smokeless tobacco: Never Used     Alcohol use Yes      Comment: not much, beer once a month     Drug use: No     Sexual activity: Not Currently     Partners: Male       Patient does not use Tobacco products.      Family History   Problem Relation Age of Onset     DIABETES Mother      Hypertension Mother      CANCER Mother      cervical     CEREBROVASCULAR DISEASE Mother      Cancer - colorectal Paternal Grandfather      Cancer - colorectal Paternal Aunt      HEART DISEASE Maternal Grandfather      heart attack     HEART DISEASE Sister      heart surgery birth defect     Respiratory Father       of pneumonia age 64         REVIEW OF SYSTEMS  10 point review systems performed otherwise negative as noted as per history of present illness.      Physical Exam:  Vitals: /85  Ht 1.645 m (5' 4.76\")  Wt 103.9 kg (229 lb)  LMP 03/15/2001  BMI 38.39 kg/m2  BMI= Body mass index is 38.39 kg/(m^2).    Constitutional: healthy, alert and no acute distress   Psychiatric: mentation appears normal and affect normal/bright  NEURO: no focal deficits  RESP: Normal with easy " respirations and no use of accessory muscles to breathe, no audible wheezing or retractions  CV: regular pulse  SKIN: No erythema, rashes, excoriation, or breakdown. No evidence of infection.   JOINT/EXTREMITIES:right shoulder - TTP proximal biceps region, , ER full, well compensated strength in rotator cuff  GAIT: non-antalgic  Lymph: no palpable lymph nodes    Diagnostic Modalities:  right shoulder X-ray: rotator cuff arthropathy with high riding humeral head  Independent visualization of the images was performed.      Impression: right Rotator cuff arthropathy    Plan:  All of the above pertinent physical exam and imaging modalities findings was reviewed with Mirtha.                                          CONSERVATIVE CARE:  I recommend conservative care for the patient to include NSAIDs, focused self directed physical therapy, activity modifications. Today I provided or dispensed info and hep.                                                FUTURE PLAN:  On their return if they still have symptoms we will consider physical therapy, EMG, NSAID's, injection of steroids.    BP Readings from Last 1 Encounters:   05/16/18 126/85       BP noted to be well controlled today in office.     Return to clinic PRN, or sooner as needed for changes.  Re-x-ray on return: No    Mireya Harris M.D.

## 2018-05-16 ENCOUNTER — RADIANT APPOINTMENT (OUTPATIENT)
Dept: GENERAL RADIOLOGY | Facility: OTHER | Age: 63
End: 2018-05-16
Attending: ORTHOPAEDIC SURGERY
Payer: COMMERCIAL

## 2018-05-16 ENCOUNTER — OFFICE VISIT (OUTPATIENT)
Dept: ORTHOPEDICS | Facility: OTHER | Age: 63
End: 2018-05-16
Payer: COMMERCIAL

## 2018-05-16 VITALS
BODY MASS INDEX: 38.15 KG/M2 | DIASTOLIC BLOOD PRESSURE: 85 MMHG | SYSTOLIC BLOOD PRESSURE: 126 MMHG | HEIGHT: 65 IN | WEIGHT: 229 LBS

## 2018-05-16 DIAGNOSIS — M25.511 SHOULDER PAIN, RIGHT: ICD-10-CM

## 2018-05-16 DIAGNOSIS — M12.811 ROTATOR CUFF TEAR ARTHROPATHY OF RIGHT SHOULDER: Primary | ICD-10-CM

## 2018-05-16 DIAGNOSIS — M75.101 ROTATOR CUFF TEAR ARTHROPATHY OF RIGHT SHOULDER: Primary | ICD-10-CM

## 2018-05-16 PROCEDURE — 99243 OFF/OP CNSLTJ NEW/EST LOW 30: CPT | Performed by: ORTHOPAEDIC SURGERY

## 2018-05-16 PROCEDURE — 73030 X-RAY EXAM OF SHOULDER: CPT | Mod: RT

## 2018-05-16 ASSESSMENT — PAIN SCALES - GENERAL: PAINLEVEL: NO PAIN (0)

## 2018-05-16 NOTE — PATIENT INSTRUCTIONS
Understanding Glenohumeral Osteoarthritis    A joint is a place where two bones meet. The glenohumeral joint is the main joint in the shoulder. It is a ball-and-socket joint. It is formed where the head or ball of the upper arm bone fits into the shallow socket on the shoulder blade. The upper arm bone is called the humerus. The socket is called the glenoid. This is how the joint gets its name. When the glenohumeral joint is healthy, it helps you rotate and move your arm freely without any pain.  With glenohumeral osteoarthritis, the parts of the joint wear down. This can cause pain, stiffness, and limited movement. Glenohumeral osteoarthritis is a long-term condition. But treatment can help manage symptoms, increase movement, and improve function.  How glenohumeral osteoarthritis occurs  All joints contain a smooth tissue called cartilage. Cartilage cushions the ends of bones. This helps them glide smoothly against each other. Glenohumeral osteoarthritis occurs when cartilage in the glenohumeral joint begins to break down. The ball and socket bones may then become exposed and rub together. The bones may become rough and pitted and may begin to wear away. This prevents smooth movement of the joint.  Causes of glenohumeral osteoarthritis  In most cases, the condition develops because of damage from a shoulder injury, such as a dislocated or broken shoulder. Normal wear and tear of the joint from aging can also cause osteoarthritis.  Symptoms of glenohumeral osteoarthritis  Symptoms tend to develop slowly over months to years. Shoulder pain is common. The pain is often worse with activity, and may get better with rest. Over time, the pain may worsen, and may even occur at night.  Stiffness in the shoulder is also common. It may be hard to move or use the arm and shoulder as you normally would. This can make even simple tasks difficult, such as reaching for an item on a shelf or getting dressed.  Treating  glenohumeral osteoarthritis  Treatment may include:    Resting the shoulder. This involves limiting certain movements, such as reaching, lifting, pushing, or pulling. These can cause further wear and tear of the joint and make symptoms worse.    Cold or heat packs. Cold packs can help reduce pain and swelling. Heat packs do not help with swelling. But they may help relieve pain and stiffness, especially before physical therapy or activity.    Pain medicines. These help relieve pain and swelling. Medicines may be prescribed or bought over the counter.    Injections of medicine into the joint. These help relieve symptoms for a time.    Physical therapy and exercises.  These help improve strength and range of motion in the joint. This may reduce shoulder stiffness and improve function.    Certain assistive devices. These are tools that can be used to make activities of daily life easier. For instance, a  grasper  can help you reach and grab items.    Alternative treatments. These include options, such as acupuncture or massage. They may help relieve pain and stiffness in some cases.  If other treatments don t do enough to relieve symptoms, you may need surgery. During surgery, the damaged joint is removed. It is then replaced with an artificial joint. This can help relieve symptoms and improve function to near normal.     When to call your healthcare provider  Call your healthcare provider right away if you have any of these:    Fever of 100.4 F (38 C) or higher, or as directed    Symptoms that don t get better with treatment, or get worse    New symptoms   Date Last Reviewed: 3/10/2016    7385-8369 [employer]. [address] [place] [address], [place] [address]. All rights reserved. This information is not intended as a substitute for professional medical care. Always follow your healthcare professional's instructions.        Osteoarthritis: Coping with Pain    There are many ways to control your pain. You re  making a good start by learning about osteoarthritis and its treatments. Knowing more about this condition helps you work with your healthcare provider to find answers to problems. Keeping a positive outlook can help you manage pain from day to day. And making time each day to relax and enjoy yourself may help you control osteoarthritis pain, instead of letting it control you. Try these methods to help you cope with, and even reduce, your pain.  Take control  Relaxing may help relieve muscle aches that result from joint pain. To relax, try these techniques:    Breathe slowly and calmly and think of a peaceful scene.    Meditate by focusing your mind on one word, object, or idea.  Getting plenty of sleep can help reduce pain and let you function better. If pain is making it hard for you to sleep, ask your doctor about ways to control pain and ensure a good night s sleep. Cutting back on caffeine and alcohol can help you sleep better. So can going to bed and getting up at about the same time every day.  Use distraction  Getting your mind off the pain may seem hard to do. But it can actually help reduce pain. When you are in pain, try one of these ways of distracting yourself:    Watch a funny movie with a friend.    Listen to music you enjoy.    Read a novel.    Talk with friends or family.    Go to a museum, park, or other favorite attraction.    Arrange to do a regular activity, such as volunteer work.  Heat and cold  Using heat and cold treatments are simple ways to lessen arthritis symptoms:    Heat soothes stiff joints and tired muscles. Heat works well before exercise, for example. Heat treatments include:  ? A warm shower or baths, or soak (for example, fill the sink with warm water and move your fingers, hands, and wrists around in the water)  ? A moist heating pad  ? A warm, moist wash cloth  ? An electric blanket or throw    Cold treatments help to numb painful areas and decrease swelling. Cold treatments  include the following wrapped in a thin towel:  ? An ice pack or bag of ice  ? A gel-filled cold pack  ? A bag of frozen vegetables, like peas or corn  Be careful when using heat or cold. You can injure your skin. Each treatment should only last for 10 to 20 minutes. Your healthcare provider or therapist can give you specific instructions.  Acupuncture  Acupuncture is a 2000-year-old practice. Practitioners insert thin needles in specific parts of the body. Research shows that it can help to relieve the pain of arthritis.  For more information or to find a practitioner in your area, contact the American Academy of Medical Acupuncture. Its website is: http://www.medicalacupuncture.org/.  Massage  Therapeutic massage has many benefits. It may:    Help you and your muscles relax    Improve blood flow to muscles and joints    Help joints stay more flexible  Look for a certified massage therapist. Many are trained to treat sore muscles and joint pain and stiffness.  Vitamins, supplements, and herbs  People with arthritis, or other long-term conditions that cause pain, often look for alternative ways to lessen pain. Vitamins, supplements, and herbs may or may not help you to feel better. Before you try any vitamin, supplement, or herb, make sure you ask your healthcare provider or pharmacist.  Physical therapy/occupational therapy  Evaluation by a physical therapist and or occupational therapist for assessment for limitations in activities of daily living  Assistance with developing an appropriate exercise routine for both muscle strengthening and cardiovascular health  Weight management  Studies have demonstrated that weight loss in overweight individuals can improve osteoarthritis symptoms.  Talk with your healthcare provider regarding your optimal weight and techniques for weight management if necessary.  Psychological treatments  Research shows that many psychological therapies or those that deal with thinking and  emotions, help people cope with arthritis pain. Therapies include cognitive-behavioral therapy (CBT), pain coping skills training, biofeedback, stress management, and hypnosis. Ask your healthcare provider for more information about these therapies.  For more information about many of these methods, contact the National Center for Complementary and Alternative Medicine (NCCAM) at http://www.[facility].gov.   Date Last Reviewed: 2/14/2016 2000-2017 [employer]. [address] [place] [address]. All rights reserved. This information is not intended as a substitute for professional medical care. Always follow your healthcare professional's instructions.        Exercises for Shoulder Flexibility: Wall Walk    Improving your flexibility can reduce pain. Stretching exercises also can help increase your range of pain-free motion. Breathe normally when you exercise. Use smooth, fluid movements.  Note: Follow any special instructions you are given. If you feel pain, stop the exercise. If the pain continues after stopping, call your healthcare provider:    Stand with your shoulder about 2 feet from the wall.    Raise your arm to shoulder level and gently  walk  your fingers up the wall as high as you can.    Hold for a few seconds. Then walk your fingers back down.    Repeat 3 times. Move closer to the wall as you repeat.    Build up to holding each stretch for 30 seconds.  Caution: Do this stretch only if your healthcare provider recommends it. Don t do it when you are first injured.       Date Last Reviewed: 8/16/2015 2000-2017 [employer]. [address] [place] [address]. All rights reserved. This information is not intended as a substitute for professional medical care. Always follow your healthcare professional's instructions.        Shoulder Exercises: Side Raise    This exercise stretches and strengthens your shoulders. Before starting, read through all the  instructions. During the exercise, breathe normally and use smooth movements. Stop if you feel any pain. If pain persists, call your healthcare provider.    Stand straight, holding a ____ pound weight in each hand, arms at sides, feet shoulder-width apart. Ask your physical therapist or healthcare provider how much weight to use.     Slowly extend your arms up and out until weights are at shoulder level. Slowly return to starting position.    Repeat ____ times. Do ____ sets ____ times a day.     CAUTION: Don t swing the weights or raise weights above shoulder level.   Date Last Reviewed: 11/1/2017 2000-2017 [employer]. [address] [place] [address]. All rights reserved. This information is not intended as a substitute for professional medical care. Always follow your healthcare professional's instructions.        Shoulder External Rotation, Side-Lying (Strength)    This exercise is for your right shoulder joint. Switch sides if the problem is in your left shoulder joint.  1. Lie on your left side with your head supported by a pillow. Place a small rolled-up towel under your right elbow.  2. Hold a hand weight in your right hand. Your healthcare provider will tell you what size hand weight to use.  3. Bend your arm in front of you at a right angle. Then bend it down and bring the hand weight to the floor. Rest your forearm on your stomach.  4. Keep your elbow on the towel and slowly lift the hand weight up in front of you. Stop when the weight is raised slightly higher than your elbow.  5. Lower the weight back down.  6. Repeat 5 to 15 times, or as instructed.  Date Last Reviewed: 3/10/2016    [id number] [employer]. [address] [place] [id number]. All rights reserved. This information is not intended as a substitute for professional medical care. Always follow your healthcare professional's instructions.

## 2018-05-16 NOTE — MR AVS SNAPSHOT
After Visit Summary   5/16/2018    Mirtha Marroquin    MRN: [id number]           Patient Information     Date Of Birth          1955        Visit Information        Provider Department      5/16/2018 10:40 AM Mireya Harris MD [facility]        Today's Diagnoses     Shoulder pain, right    -  1      Care Instructions      Understanding Glenohumeral Osteoarthritis    A joint is a place where two bones meet. The glenohumeral joint is the main joint in the shoulder. It is a ball-and-socket joint. It is formed where the head or ball of the upper arm bone fits into the shallow socket on the shoulder blade. The upper arm bone is called the humerus. The socket is called the glenoid. This is how the joint gets its name. When the glenohumeral joint is healthy, it helps you rotate and move your arm freely without any pain.  With glenohumeral osteoarthritis, the parts of the joint wear down. This can cause pain, stiffness, and limited movement. Glenohumeral osteoarthritis is a long-term condition. But treatment can help manage symptoms, increase movement, and improve function.  How glenohumeral osteoarthritis occurs  All joints contain a smooth tissue called cartilage. Cartilage cushions the ends of bones. This helps them glide smoothly against each other. Glenohumeral osteoarthritis occurs when cartilage in the glenohumeral joint begins to break down. The ball and socket bones may then become exposed and rub together. The bones may become rough and pitted and may begin to wear away. This prevents smooth movement of the joint.  Causes of glenohumeral osteoarthritis  In most cases, the condition develops because of damage from a shoulder injury, such as a dislocated or broken shoulder. Normal wear and tear of the joint from aging can also cause osteoarthritis.  Symptoms of glenohumeral osteoarthritis  Symptoms tend to develop slowly over months to years. Shoulder pain is common. The  pain is often worse with activity, and may get better with rest. Over time, the pain may worsen, and may even occur at night.  Stiffness in the shoulder is also common. It may be hard to move or use the arm and shoulder as you normally would. This can make even simple tasks difficult, such as reaching for an item on a shelf or getting dressed.  Treating glenohumeral osteoarthritis  Treatment may include:    Resting the shoulder. This involves limiting certain movements, such as reaching, lifting, pushing, or pulling. These can cause further wear and tear of the joint and make symptoms worse.    Cold or heat packs. Cold packs can help reduce pain and swelling. Heat packs do not help with swelling. But they may help relieve pain and stiffness, especially before physical therapy or activity.    Pain medicines. These help relieve pain and swelling. Medicines may be prescribed or bought over the counter.    Injections of medicine into the joint. These help relieve symptoms for a time.    Physical therapy and exercises.  These help improve strength and range of motion in the joint. This may reduce shoulder stiffness and improve function.    Certain assistive devices. These are tools that can be used to make activities of daily life easier. For instance, a  grasper  can help you reach and grab items.    Alternative treatments. These include options, such as acupuncture or massage. They may help relieve pain and stiffness in some cases.  If other treatments don t do enough to relieve symptoms, you may need surgery. During surgery, the damaged joint is removed. It is then replaced with an artificial joint. This can help relieve symptoms and improve function to near normal.     When to call your healthcare provider  Call your healthcare provider right away if you have any of these:    Fever of 100.4 F (38 C) or higher, or as directed    Symptoms that don t get better with treatment, or get worse    New symptoms   Date Last  Reviewed: 3/10/2016    8529-5635 [employer]. [address] [place] [address], [place] [address]. All rights reserved. This information is not intended as a substitute for professional medical care. Always follow your healthcare professional's instructions.        Osteoarthritis: Coping with Pain    There are many ways to control your pain. You re making a good start by learning about osteoarthritis and its treatments. Knowing more about this condition helps you work with your healthcare provider to find answers to problems. Keeping a positive outlook can help you manage pain from day to day. And making time each day to relax and enjoy yourself may help you control osteoarthritis pain, instead of letting it control you. Try these methods to help you cope with, and even reduce, your pain.  Take control  Relaxing may help relieve muscle aches that result from joint pain. To relax, try these techniques:    Breathe slowly and calmly and think of a peaceful scene.    Meditate by focusing your mind on one word, object, or idea.  Getting plenty of sleep can help reduce pain and let you function better. If pain is making it hard for you to sleep, ask your doctor about ways to control pain and ensure a good night s sleep. Cutting back on caffeine and alcohol can help you sleep better. So can going to bed and getting up at about the same time every day.  Use distraction  Getting your mind off the pain may seem hard to do. But it can actually help reduce pain. When you are in pain, try one of these ways of distracting yourself:    Watch a funny movie with a friend.    Listen to music you enjoy.    Read a novel.    Talk with friends or family.    Go to a museum, park, or other favorite attraction.    Arrange to do a regular activity, such as volunteer work.  Heat and cold  Using heat and cold treatments are simple ways to lessen arthritis symptoms:    Heat soothes stiff joints and tired muscles. Heat works well before  exercise, for example. Heat treatments include:  ? A warm shower or baths, or soak (for example, fill the sink with warm water and move your fingers, hands, and wrists around in the water)  ? A moist heating pad  ? A warm, moist wash cloth  ? An electric blanket or throw    Cold treatments help to numb painful areas and decrease swelling. Cold treatments include the following wrapped in a thin towel:  ? An ice pack or bag of ice  ? A gel-filled cold pack  ? A bag of frozen vegetables, like peas or corn  Be careful when using heat or cold. You can injure your skin. Each treatment should only last for 10 to 20 minutes. Your healthcare provider or therapist can give you specific instructions.  Acupuncture  Acupuncture is a 2000-year-old practice. Practitioners insert thin needles in specific parts of the body. Research shows that it can help to relieve the pain of arthritis.  For more information or to find a practitioner in your area, contact the American Academy of Medical Acupuncture. Its website is: http://www.medicalacupuncture.org/.  Massage  Therapeutic massage has many benefits. It may:    Help you and your muscles relax    Improve blood flow to muscles and joints    Help joints stay more flexible  Look for a certified massage therapist. Many are trained to treat sore muscles and joint pain and stiffness.  Vitamins, supplements, and herbs  People with arthritis, or other long-term conditions that cause pain, often look for alternative ways to lessen pain. Vitamins, supplements, and herbs may or may not help you to feel better. Before you try any vitamin, supplement, or herb, make sure you ask your healthcare provider or pharmacist.  Physical therapy/occupational therapy  Evaluation by a physical therapist and or occupational therapist for assessment for limitations in activities of daily living  Assistance with developing an appropriate exercise routine for both muscle strengthening and cardiovascular  health  Weight management  Studies have demonstrated that weight loss in overweight individuals can improve osteoarthritis symptoms.  Talk with your healthcare provider regarding your optimal weight and techniques for weight management if necessary.  Psychological treatments  Research shows that many psychological therapies or those that deal with thinking and emotions, help people cope with arthritis pain. Therapies include cognitive-behavioral therapy (CBT), pain coping skills training, biofeedback, stress management, and hypnosis. Ask your healthcare provider for more information about these therapies.  For more information about many of these methods, contact the National Center for Complementary and Alternative Medicine (NCCAM) at http://www.nccam.nih.gov.   Date Last Reviewed: 2/14/2016 2000-2017 [employer]. [address] [place] [address], [place] [address]. All rights reserved. This information is not intended as a substitute for professional medical care. Always follow your healthcare professional's instructions.        Exercises for Shoulder Flexibility: Wall Walk    Improving your flexibility can reduce pain. Stretching exercises also can help increase your range of pain-free motion. Breathe normally when you exercise. Use smooth, fluid movements.  Note: Follow any special instructions you are given. If you feel pain, stop the exercise. If the pain continues after stopping, call your healthcare provider:    Stand with your shoulder about 2 feet from the wall.    Raise your arm to shoulder level and gently  walk  your fingers up the wall as high as you can.    Hold for a few seconds. Then walk your fingers back down.    Repeat 3 times. Move closer to the wall as you repeat.    Build up to holding each stretch for 30 seconds.  Caution: Do this stretch only if your healthcare provider recommends it. Don t do it when you are first injured.       Date Last Reviewed: 8/16/2015 2000-2017 [employer]  [employer]. [address] [place] [address]. All rights reserved. This information is not intended as a substitute for professional medical care. Always follow your healthcare professional's instructions.        Shoulder Exercises: Side Raise    This exercise stretches and strengthens your shoulders. Before starting, read through all the instructions. During the exercise, breathe normally and use smooth movements. Stop if you feel any pain. If pain persists, call your healthcare provider.    Stand straight, holding a ____ pound weight in each hand, arms at sides, feet shoulder-width apart. Ask your physical therapist or healthcare provider how much weight to use.     Slowly extend your arms up and out until weights are at shoulder level. Slowly return to starting position.    Repeat ____ times. Do ____ sets ____ times a day.     CAUTION: Don t swing the weights or raise weights above shoulder level.   Date Last Reviewed: 11/1/2017 2000-2017 [employer]. [address] [place] [address]. All rights reserved. This information is not intended as a substitute for professional medical care. Always follow your healthcare professional's instructions.        Shoulder External Rotation, Side-Lying (Strength)    This exercise is for your right shoulder joint. Switch sides if the problem is in your left shoulder joint.  1. Lie on your left side with your head supported by a pillow. Place a small rolled-up towel under your right elbow.  2. Hold a hand weight in your right hand. Your healthcare provider will tell you what size hand weight to use.  3. Bend your arm in front of you at a right angle. Then bend it down and bring the hand weight to the floor. Rest your forearm on your stomach.  4. Keep your elbow on the towel and slowly lift the hand weight up in front of you. Stop when the weight is raised slightly higher than your elbow.  5. Lower the weight back down.  6. Repeat 5 to 15 times,  "or as instructed.  Date Last Reviewed: 3/10/2016    4664-6836 [employer]. [address] [place] [address], [place] [address]. All rights reserved. This information is not intended as a substitute for professional medical care. Always follow your healthcare professional's instructions.                Follow-ups after your visit        Who to contact     If you have questions or need follow up information about today's clinic visit or your schedule please contact [facility] directly at [phone].  Normal or non-critical lab and imaging results will be communicated to you by MyChart, letter or phone within 4 business days after the clinic has received the results. If you do not hear from us within 7 days, please contact the clinic through [employer] or phone. If you have a critical or abnormal lab result, we will notify you by phone as soon as possible.  Submit refill requests through [employer] or call your pharmacy and they will forward the refill request to us. Please allow 3 business days for your refill to be completed.          Additional Information About Your Visit        ZeeWhere Information     [employer]Where lets you send messages to your doctor, view your test results, renew your prescriptions, schedule appointments and more. To sign up, go to [email or web address] . Click on \"Log in\" on the left side of the screen, which will take you to the Welcome page. Then click on \"Sign up Now\" on the right side of the page.     You will be asked to enter the access code listed below, as well as some personal information. Please follow the directions to create your username and password.     Your access code is: [email or web address]  Expires: 2018 12:13 PM     Your access code will  in 90 days. If you need help or a new code, please call your [place] clinic or [phone].        Care EveryWhere ID     This is your Care EveryWhere ID. This could be used by other organizations to access your [facility] " "medical records  [id number]        Your Vitals Were     Height Last Period BMI (Body Mass Index)             1.645 m (5' 4.76\") 03/15/2001 38.39 kg/m2          Blood Pressure from Last 3 Encounters:   05/16/18 126/85   05/11/18 112/76   10/16/17 112/84    Weight from Last 3 Encounters:   05/16/18 103.9 kg (229 lb)   05/11/18 103 kg (227 lb)   10/16/17 103 kg (227 lb)               Primary Care Provider Office Phone # Fax #    Jeni LEWIS ELIAS Heck [phone]       [address] [facility] [address]  [facility] [address]        Equal Access to Services     JOAQUINA BAIRD : Hadii aad ku hadasho Soomaali, waaxda luqadaha, qaybta kaalmada adeegyada, bina kang . So [facility] [phone].    ATENCIÓN: Si habla español, tiene a vallecillo disposición servicios gratuitos de asistencia lingüística. Ll[facility] [phone].    We comply with applicable federal civil rights laws and [place] laws. We do not discriminate on the basis of race, color, national origin, age, disability, sex, sexual orientation, or gender identity.            Thank you!     Thank you for choosing [facility]  for your care. Our goal is always to provide you with excellent care. Hearing back from our patients is one way we can continue to improve our services. Please take a few minutes to complete the written survey that you may receive in the mail after your visit with us. Thank you!             Your Updated Medication List - Protect others around you: Learn how to safely use, store and throw away your medicines at www.disposemymeds.org.          This list is accurate as of 5/16/18 11:15 AM.  Always use your most recent med list.                   Brand Name Dispense Instructions for use Diagnosis    azithromycin 250 MG tablet    ZITHROMAX    6 tablet    Two tablets first day, then one tablet daily for four days.    Acute suppurative otitis media of right ear without spontaneous rupture of tympanic " membrane, recurrence not specified       ciprofloxacin-dexamethasone otic suspension    CIPRODEX    7.5 mL    Place 4 drops into both ears 2 times daily for 7 days    Acute otitis externa of both ears, unspecified type       FISH OIL           GLUCOSAMINE SULFATE PO           Iron 25 MG Tabs      Take  by mouth.        LUTEIN PO           MAGNESIUM OXIDE PO           Multi-vitamin Tabs tablet   Generic drug:  multivitamin, therapeutic with minerals           PROBIOTIC DAILY PO      Take 1 tablet by mouth        VITAMIN B COMPLEX PO      One daily        VITAMIN D PO      one daily        ZYRTEC 10 MG tablet   Generic drug:  cetirizine     0    ONE TABLET DAILY    Chronic rhinitis

## 2018-05-16 NOTE — LETTER
5/16/2018         RE: Mirtha Marroquin  [address]  [place] [address]        Dear Colleague,    Thank you for referring your patient, Mirtha Marroquin, to the [facility]. Please see a copy of my visit note below.    ORTHOPEDIC CONSULT      Chief Complaint: Mirtha Marroquin is a 62 year old right hand dominant female who works as a .        She is being seen for   Chief Complaints and History of Present Illnesses   Patient presents with     Consult     rt shoulder pain per Jeni Heck PA-C         History of Present Illness:   Mirtha Marroquin is a 62 year old female who is seen in consultation at the request of Jeni Heck PA-C.  History of Present illness:  Mirtha presents for evaluation of:  1.) rt shoulder  Onset: off and on for years   Symptoms brought on by: over use at work .   Character:  stabbing and radiation of pain numbness into hand at times.    Progression of symptoms:  same.    Previous similar pain: YES.   Pain Level:  0/10.   Previous treatments:  nothing and Ibuprofen.  Currently on Blood thinners? No  Diagnosis of Diabetes? No  Intermittent for a long time  Decreased ROM, weakness, anterior shoulder pain  Sometimes she sleeps on the arm and it falls asleep      Patient's past medical, surgical, social and family histories reviewed.       Past Medical History:   Diagnosis Date     Abnormal weight gain 10/2009     Absence of menstruation 2002    LMP 2002     Chronic rhinitis      Dyspepsia and other specified disorders of function of stomach 5/2006     Osteopenia      Pure hypercholesterolemia     worse 2009     Tobacco use disorder     quit 2008         Past Surgical History:   Procedure Laterality Date     C DEXA INTERPRETATION, AXIAL  10/2004    WNL 12/2007 min change     C NONSPECIFIC PROCEDURE  1974    Gastric bypass     COLONOSCOPY  11/30/07    [facility]-repeat in 5yrs     COLONOSCOPY  12/17/12      "[facility] COLONOSCOPY W/WO BRUSH/WASH  10/5/2004    tubular adenoma - repeat 3 years per pt (?)     HC REMV CATARACT EXTRACAP,INSERT LENS           Medications:    Current Outpatient Prescriptions on File Prior to Visit:  azithromycin (ZITHROMAX) 250 MG tablet Two tablets first day, then one tablet daily for four days.   ciprofloxacin-dexamethasone (CIPRODEX) otic suspension Place 4 drops into both ears 2 times daily for 7 days   FISH OIL    GLUCOSAMINE SULFATE PO    Iron 25 MG TABS Take  by mouth.   LUTEIN PO    MAGNESIUM OXIDE PO    MULTI-VITAMIN OR TABS    Probiotic Product (PROBIOTIC DAILY PO) Take 1 tablet by mouth   VITAMIN B COMPLEX OR One daily   VITAMIN D OR one daily   ZYRTEC 10 MG OR TABS ONE TABLET DAILY     No current facility-administered medications on file prior to visit.       Allergies   Allergen Reactions     No Known Drug Allergies          Social History     Occupational History      [employer]     Social History Main Topics     Smoking status: Former Smoker     Packs/day: 0.80     Years: 20.00     Types: Cigarettes     Quit date: 2009     Smokeless tobacco: Never Used     Alcohol use Yes      Comment: not much, beer once a month     Drug use: No     Sexual activity: Not Currently     Partners: Male       Patient does not use Tobacco products.      Family History   Problem Relation Age of Onset     DIABETES Mother      Hypertension Mother      CANCER Mother      cervical     CEREBROVASCULAR DISEASE Mother      Cancer - colorectal Paternal Grandfather      Cancer - colorectal Paternal Aunt      HEART DISEASE Maternal Grandfather      heart attack     HEART DISEASE Sister      heart surgery birth defect     Respiratory Father       of pneumonia age 64         REVIEW OF SYSTEMS  10 point review systems performed otherwise negative as noted as per history of present illness.      Physical Exam:  Vitals: /85  Ht 1.645 m (5' 4.76\")  Wt 103.9 kg (229 lb)  LMP " 03/15/2001  BMI 38.39 kg/m2  BMI= Body mass index is 38.39 kg/(m^2).    Constitutional: healthy, alert and no acute distress   Psychiatric: mentation appears normal and affect normal/bright  NEURO: no focal deficits  RESP: Normal with easy respirations and no use of accessory muscles to breathe, no audible wheezing or retractions  CV: regular pulse  SKIN: No erythema, rashes, excoriation, or breakdown. No evidence of infection.   JOINT/EXTREMITIES:right shoulder - TTP proximal biceps region, , ER full, well compensated strength in rotator cuff  GAIT: non-antalgic  Lymph: no palpable lymph nodes    Diagnostic Modalities:  right shoulder X-ray: rotator cuff arthropathy with high riding humeral head  Independent visualization of the images was performed.      Impression: right Rotator cuff arthropathy    Plan:  All of the above pertinent physical exam and imaging modalities findings was reviewed with Mirtha.                                          CONSERVATIVE CARE:  I recommend conservative care for the patient to include NSAIDs, focused self directed physical therapy, activity modifications. Today I provided or dispensed info and hep.                                                FUTURE PLAN:  On their return if they still have symptoms we will consider physical therapy, EMG, NSAID's, injection of steroids.    BP Readings from Last 1 Encounters:   05/16/18 126/85       BP noted to be well controlled today in office.     Return to clinic PRN, or sooner as needed for changes.  Re-x-ray on return: No    Mireya Harris M.D.    Again, thank you for allowing me to participate in the care of your patient.        Sincerely,        Mireya Harris MD

## 2018-05-29 ENCOUNTER — TELEPHONE (OUTPATIENT)
Dept: FAMILY MEDICINE | Facility: OTHER | Age: 63
End: 2018-05-29

## 2018-05-29 NOTE — TELEPHONE ENCOUNTER
Reason for Call:  Medication or medication refill:    Do you use a [facility]?  Name of the pharmacy and phone number for the current request:  [facility] IN [place]    Name of the medication requested: Pt sattawny she was seen 2 wks ago and was prescribed some medications, the medication is gone and her ears are still sore and she thinks the infection is not gone yet. She would like to know if she should have a new prescription. She also stated that she takes magnesium and she thinks it had an effect on the prescription she got.     Other request:     Can we leave a detailed message on this number? YES    Phone number patient can be reached at: Home number on file [phone] (home)    Best Time: any    Call taken on 5/29/2018 at 11:53 AM by Geraldine Crowell

## 2018-05-29 NOTE — TELEPHONE ENCOUNTER
LM for the patient to return call to the clinic to discuss the below. Will await to hear from patient. Akila Simms RN, BSN     Symptoms have not resolved after antibiotic use. Please assist with scheduling OV for re-check of ears. Akila Simms RN, BSN

## 2018-05-30 NOTE — TELEPHONE ENCOUNTER
LM for the patient to return call to the clinic to discuss the below. Will await to hear from patient. Akila Simms RN, BSN

## 2018-05-31 NOTE — TELEPHONE ENCOUNTER
Pt saw a doctor at work and was told the infection is gone. No other concerns at this time.    Tamia Martinez, RN, BSN

## 2018-10-04 ENCOUNTER — ALLIED HEALTH/NURSE VISIT (OUTPATIENT)
Dept: FAMILY MEDICINE | Facility: OTHER | Age: 63
End: 2018-10-04
Payer: COMMERCIAL

## 2018-10-04 ENCOUNTER — RADIANT APPOINTMENT (OUTPATIENT)
Dept: MAMMOGRAPHY | Facility: OTHER | Age: 63
End: 2018-10-04
Payer: COMMERCIAL

## 2018-10-04 DIAGNOSIS — Z23 NEED FOR PROPHYLACTIC VACCINATION AND INOCULATION AGAINST INFLUENZA: Primary | ICD-10-CM

## 2018-10-04 DIAGNOSIS — Z12.31 VISIT FOR SCREENING MAMMOGRAM: ICD-10-CM

## 2018-10-04 PROCEDURE — 77067 SCR MAMMO BI INCL CAD: CPT | Mod: TC

## 2018-10-04 PROCEDURE — 90471 IMMUNIZATION ADMIN: CPT

## 2018-10-04 PROCEDURE — 90682 RIV4 VACC RECOMBINANT DNA IM: CPT

## 2018-10-04 PROCEDURE — 99207 ZZC NO CHARGE NURSE ONLY: CPT

## 2018-10-04 NOTE — PROGRESS NOTES

## 2018-10-04 NOTE — MR AVS SNAPSHOT
"              After Visit Summary   10/4/2018    Mirtha Marroquin    MRN: [id number]           Patient Information     Date Of Birth          1955        Visit Information        Provider Department      10/4/2018 11:00 AM NL FLU SHOT [facility]        Today's Diagnoses     Need for prophylactic vaccination and inoculation against influenza    -  1       Follow-ups after your visit        Your next 10 appointments already scheduled     Oct 04, 2018 11:00 AM CDT   Nurse Only with NL [facility]U [facility]OT [facility]   [facility]    [address]  [facility] [address]   [phone]              Who to contact     If you have questions or need follow up information about today's clinic visit or your schedule please contact [facility] directly at [phone].  Normal or non-critical lab and imaging results will be communicated to you by Resilinchart, letter or phone within 4 business days after the clinic has received the results. If you do not hear from us within 7 days, please contact the clinic through MyChart or phone. If you have a critical or abnormal lab result, we will notify you by phone as soon as possible.  Submit refill requests through [facility] or call your pharmacy and they will forward the refill request to us. Please allow 3 business days for your refill to be completed.          Additional Information About Your Visit        MyChart Information     [facility] lets you send messages to your doctor, view your test results, renew your prescriptions, schedule appointments and more. To sign up, go to [email or web address] . Click on \"Log in\" on the left side of the screen, which will take you to the Welcome page. Then click on \"Sign up Now\" on the right side of the page.     You will be asked to enter the access code listed below, as well as some personal information. Please follow the directions to create your username and " password.     Your access code is: [email or web address]  Expires: 2019 10:32 AM     Your access code will  in 90 days. If you need help or a new code, please call your [facility] clinic or [phone].        Care EveryWhere ID     This is your Care EveryWhere ID. This could be used by other organizations to access your [facility] medical records  [id number]        Your Vitals Were     Last Period                   03/15/2001            Blood Pressure from Last 3 Encounters:   18 126/85   18 112/76   10/16/17 112/84    Weight from Last 3 Encounters:   18 229 lb (103.9 kg)   18 227 lb (103 kg)   10/16/17 227 lb (103 kg)              We Performed the Following     FLU VACCINE, (RIV4) RECOMBINANT HA  , IM (FluBlok, egg free) [55478]- >18 YRS ([facility] recommended  50-64 YRS)     Vaccine Administration, Initial [[id number]]        Primary Care Provider Office Phone # Fax #    Jeni Heck PA-C [phone]       [address] [address]        Equal Access to Services     JOAQUINA BAIRD : Hadii eleni florentino hadasho Soomaali, waaxda luqadaha, qaybta kaalmada adeegyada, bina kang . So [facility] [phone].    ATENCIÓN: Si habla español, tiene a vallecillo disposición servicios gratuitos de asistencia lingüística. Llame al [phone].    We comply with applicable federal civil rights laws and [place] laws. We do not discriminate on the basis of race, color, national origin, age, disability, sex, sexual orientation, or gender identity.            Thank you!     Thank you for choosing [facility]  for your care. Our goal is always to provide you with excellent care. Hearing back from our patients is one way we can continue to improve our services. Please take a few minutes to complete the written survey that you may receive in the mail after your visit with us. Thank you!             Your Updated Medication List - Protect others  around you: Learn how to safely use, store and throw away your medicines at www.disposemymeds.org.          This list is accurate as of 10/4/18 10:32 AM.  Always use your most recent med list.                   Brand Name Dispense Instructions for use Diagnosis    azithromycin 250 MG tablet    ZITHROMAX    6 tablet    Two tablets first day, then one tablet daily for four days.    Acute suppurative otitis media of right ear without spontaneous rupture of tympanic membrane, recurrence not specified       FISH OIL           GLUCOSAMINE SULFATE PO           Iron 25 MG Tabs      Take  by mouth.        LUTEIN PO           MAGNESIUM OXIDE PO           Multi-vitamin Tabs tablet   Generic drug:  multivitamin, therapeutic with minerals           PROBIOTIC DAILY PO      Take 1 tablet by mouth        VITAMIN B COMPLEX PO      One daily        VITAMIN D PO      one daily        ZYRTEC 10 MG tablet   Generic drug:  cetirizine     0    ONE TABLET DAILY    Chronic rhinitis

## 2019-02-22 ENCOUNTER — TELEPHONE (OUTPATIENT)
Dept: FAMILY MEDICINE | Facility: OTHER | Age: 64
End: 2019-02-22

## 2019-02-22 DIAGNOSIS — E78.5 HYPERLIPIDEMIA LDL GOAL <160: Primary | ICD-10-CM

## 2019-02-22 DIAGNOSIS — Z83.3 FAMILY HISTORY OF DIABETES MELLITUS: ICD-10-CM

## 2019-02-22 NOTE — TELEPHONE ENCOUNTER
Lipid panel pended per health maintenance. Patient seeing [facility] for physical so will route to review/place additional orders if appropriate  Sherly Mariscal CMA

## 2019-02-22 NOTE — TELEPHONE ENCOUNTER
Patient coming in for lab 2/26/19  Please place orders as needed.  Has physical appointment  3/1  Thank you  Roseann SWAIN) [facility]

## 2019-02-27 DIAGNOSIS — Z83.3 FAMILY HISTORY OF DIABETES MELLITUS: ICD-10-CM

## 2019-02-27 DIAGNOSIS — E78.5 HYPERLIPIDEMIA LDL GOAL <160: ICD-10-CM

## 2019-02-27 LAB
CHOLEST SERPL-MCNC: 246 MG/DL
GLUCOSE SERPL-MCNC: 102 MG/DL (ref 70–99)
HDLC SERPL-MCNC: 79 MG/DL
LDLC SERPL CALC-MCNC: 144 MG/DL
NONHDLC SERPL-MCNC: 167 MG/DL
TRIGL SERPL-MCNC: 113 MG/DL

## 2019-02-27 PROCEDURE — 80061 LIPID PANEL: CPT | Performed by: NURSE PRACTITIONER

## 2019-02-27 PROCEDURE — 82947 ASSAY GLUCOSE BLOOD QUANT: CPT | Performed by: NURSE PRACTITIONER

## 2019-02-27 PROCEDURE — 36415 COLL VENOUS BLD VENIPUNCTURE: CPT | Performed by: NURSE PRACTITIONER

## 2019-03-06 NOTE — PATIENT INSTRUCTIONS
- Xray today of your left shoulder, will let you know your results. Orthopedics will call you to schedule a follow up.   - Recommend [facility] at [facility], ok to do aerobic exercise as long as you do not have any chest pain.   - Wait to start lifting weights until we do xray and see orthopedic doctor.   - Labs look ok, work on diet and exercise. We should recheck your cholesterol in 6 months. You do not need an OV for this, just come in fasting.   - Follow up for annual physical in 1 year.   - Follow up with ENT for your chronic ear pain.     AMOR Mazariegos CNP              Preventive Health Recommendations  Female Ages 50 - 64    Yearly exam: See your health care provider every year in order to  o Review health changes.   o Discuss preventive care.    o Review your medicines if your doctor has prescribed any.      Get a Pap test every three years (unless you have an abnormal result and your provider advises testing more often).    If you get Pap tests with HPV test, you only need to test every 5 years, unless you have an abnormal result.     You do not need a Pap test if your uterus was removed (hysterectomy) and you have not had cancer.    You should be tested each year for STDs (sexually transmitted diseases) if you're at risk.     Have a mammogram every 1 to 2 years.    Have a colonoscopy at age 50, or have a yearly FIT test (stool test). These exams screen for colon cancer.      Have a cholesterol test every 5 years, or more often if advised.    Have a diabetes test (fasting glucose) every three years. If you are at risk for diabetes, you should have this test more often.     If you are at risk for osteoporosis (brittle bone disease), think about having a bone density scan (DEXA).    Shots: Get a flu shot each year. Get a tetanus shot every 10 years.    Nutrition:     Eat at least 5 servings of fruits and vegetables each day.    Eat whole-grain bread, whole-wheat pasta and brown rice instead of white  grains and rice.    Get adequate Calcium and Vitamin D.     Lifestyle    Exercise at least 150 minutes a week (30 minutes a day, 5 days a week). This will help you control your weight and prevent disease.    Limit alcohol to one drink per day.    No smoking.     Wear sunscreen to prevent skin cancer.     See your dentist every six months for an exam and cleaning.    See your eye doctor every 1 to 2 years.    Lung Cancer Screening   Frequently Asked Questions  If you are at high-risk for lung cancer, getting screened with low-dose computed tomography (LDCT) every year can help save your life. This handout offers answers to some of the most common questions about lung cancer screening. If you have other questions, please call [phone] ([phone]).     What is it?  Lung cancer screening uses special X-ray technology to create an image of your lung tissue. The exam is quick and easy and takes less than 10 seconds. We don t give you any medicine or use any needles. You can eat before and after the exam. You don t need to change your clothes as long as the clothing on your chest doesn t contain metal. But, you do need to be able to hold your breath for at least 6 seconds during the exam.    What is the goal of lung cancer screening?  The goal of lung cancer screening is to save lives. Many times, lung cancer is not found until a person starts having physical symptoms. Lung cancer screening can help detect lung cancer in the earliest stages when it may be easier to treat.    Who should be screened for lung cancer?  We suggest lung cancer screening for anyone who is at high-risk for lung cancer. You are in the high-risk group if you:      are between the ages of 55 and 79, and    have smoked at least 1 pack of cigarettes a day for 30 or more years, and    still smoke or have quit within the past 15 years.    However, if you have a new cough or shortness of breath, you should talk to your doctor before being  screened.    Some national lung health advocacy groups also recommend screening for people ages 50 to 79 who have smoked an average of 1 pack of cigarettes a day for 20 years. They must also have at least 1 other risk factor for lung cancer, not including exposure to secondhand smoke. Other risk factors are having had cancer in the past, emphysema, pulmonary fibrosis, COPD, a family history of lung cancer, or exposure to certain materials such as arsenic, asbestos, beryllium, cadmium, chromium, diesel fumes, nickel, radon or silica. Your care team can help you know if you have one of these risk factors.     Why does it matter if I have symptoms?  Certain symptoms can be a sign that you have a condition in your lungs that should be checked and treated by your doctor. These symptoms include fever, chest pain, a new or changing cough, shortness of breath that you have never felt before, coughing up blood or unexplained weight loss. Having any of these symptoms can greatly affect the results of lung cancer screening.       Should all smokers get an LDCT lung cancer screening exam?  It depends. Lung cancer screening is for a very specific group of men and women who have a history of heavy smoking over a long period of time (see  Who should be screened for lung cancer  above).  I am in the high-risk group, but have been diagnosed with cancer in the past. Is LDCT lung cancer screening right for me?  In some cases, you should not have LDCT lung screening, such as when your doctor is already following your cancer with CT scan studies. Your doctor will help you decide if LDCT lung screening is right for you.  Do I need to have a screening exam every year?  Yes. If you are in the high-risk group described earlier, you should get an LDCT lung cancer screening exam every year until you are 79, or are no longer willing or able to undergo screening and possible procedures to diagnose and treat lung cancer.  How effective is LDCT  at preventing death from lung cancer?  Studies have shown that LDCT lung cancer screening can lower the risk of death from lung cancer by 20 percent in people who are at high-risk.  What are the risks?  There are some risks and limitations of LDCT lung cancer screening. We want to make sure you understand the risks and benefits, so please let us know if you have any questions. Your doctor may want to talk with you more about these risks.    Radiation exposure: As with any exam that uses radiation, there is a very small increased risk of cancer. The amount of radiation in LDCT is small--about the same amount a person would get from a mammogram. Your doctor orders the exam when he or she feels the potential benefits outweigh the risks.    False negatives: No test is perfect, including LDCT. It is possible that you may have a medical condition, including lung cancer, that is not found during your exam. This is called a false negative result.    False positives and more testing: LDCT very often finds something in the lung that could be cancer, but in fact is not. This is called a false positive result. False positive tests often cause anxiety. To make sure these findings are not cancer, you may need to have more tests. These tests will be done only if you give us permission. Sometimes patients need a treatment that can have side effects, such as a biopsy. For more information on false positives, see  What can I expect from the results?     Findings not related to lung cancer: Your LDCT exam also takes pictures of areas of your body next to your lungs. In a very small number of cases, the CT scan will show an abnormal finding in one of these areas, such as your kidneys, adrenal glands, liver or thyroid. This finding may not be serious, but you may need more tests. Your doctor can help you decide what other tests you may need, if any.  What can I expect from the results?  About 1 out of 4 LDCT exams will find something  that may need more tests. Most of the time, these findings are lung nodules. Lung nodules are very small collections of tissue in the lung. These nodules are very common, and the vast majority--more than 97 percent--are not cancer (benign). Most are normal lymph nodes or small areas of scarring from past infections.  But, if a small lung nodule is found to be cancer, the cancer can be cured more than 90 percent of the time. To know if the nodule is cancer, we may need to get more images before your next yearly screening exam. If the nodule has suspicious features (for example, it is large, has an odd shape or grows over time), we will refer you to a specialist for further testing.  Will my doctor also get the results?  Yes. Your doctor will get a copy of your results.  Is it okay to keep smoking now that there s a cancer screening exam?  No. Tobacco is one of the strongest cancer-causing agents. It causes not only lung cancer, but other cancers and cardiovascular (heart) diseases as well. The damage caused by smoking builds over time. This means that the longer you smoke, the higher your risk of disease. While it is never too late to quit, the sooner you quit, the better.  Where can I find help to quit smoking?  The best way to prevent lung cancer is to stop smoking. If you have already quit smoking, congratulations and keep it up! For help on quitting smoking, please call [employer] at 9-135-535-WQKS ([phone]) or the American [employer] Society at 1-885.375.9073 to find local resources near you.  One-on-one health coaching:  If you d prefer to work individually with a health care provider on tobacco cessation, we offer:      Medication Therapy Management:  Our specially trained pharmacists work closely with you and your doctor to help you quit smoking.  Call [phone] or [phone] (toll free).     Can Do: Health coaching offered by [facility].  [email or web address]

## 2019-03-06 NOTE — PROGRESS NOTES
SUBJECTIVE:   CC: Mirtha Marroquin is an 63 year old woman who presents for preventive health visit.     Physical   Annual:     Getting at least 3 servings of Calcium per day:  Yes    Bi-annual eye exam:  Yes    Dental care twice a year:  Yes    Sleep apnea or symptoms of sleep apnea:  None    Diet:  Regular (no restrictions)    Duration of exercise:  15-30 minutes    Taking medications regularly:  Yes    Medication side effects:  None    Additional concerns today:  Yes    PHQ-2 Total Score: 0    Joined the [employer]  Retired in July, gained some weight between then and now  Wanted to know what she can tell the [employer] [facility] what she can do  Has chronic shoulder history from painting, right shoulder is stable, now having some pain with the left shoulder. Flares up off and on (left). Right is weaker when she lifts up, right handed. No numbness and tingling. Can feel it popping at times.       Look into ears.     The 10-year ASCVD risk score (Kandi MENDEZ Jr., et al., 2013) is: 4.5%    Values used to calculate the score:      Age: 63 years      Sex: Female      Is Non- : No      Diabetic: No      Tobacco smoker: No      Systolic Blood Pressure: 124 mmHg      Is BP treated: No      HDL Cholesterol: 79 mg/dL      Total Cholesterol: 246 mg/dL      Today's PHQ-2 Score:   PHQ-2 (  [employer]) 3/22/2019   Q1: Little interest or pleasure in doing things 0   Q2: Feeling down, depressed or hopeless 0   PHQ-2 Score 0   Q1: Little interest or pleasure in doing things Not at all   Q2: Feeling down, depressed or hopeless Not at all   PHQ-2 Score 0       Abuse: Current or Past(Physical, Sexual or Emotional)- No  Do you feel safe in your environment? Yes    Social History     Tobacco Use     Smoking status: Former Smoker     Packs/day: 2.00     Years: 20.00     Pack years: 40.00     Types: Cigarettes     Start date: 1976     Last attempt to quit: 2009     Years since quittin.6     Smokeless tobacco: Never Used    Substance Use Topics     Alcohol use: Yes     Comment: not much, beer once a month     Alcohol Use 3/22/2019   If you drink alcohol do you typically have greater than 3 drinks per day OR greater than 7 drinks per week? No   No flowsheet data found.    Reviewed orders with patient.  Reviewed health maintenance and updated orders accordingly - Yes  BP Readings from Last 3 Encounters:   03/22/19 124/76   05/16/18 126/85   05/11/18 112/76    Wt Readings from Last 3 Encounters:   03/22/19 113.4 kg (250 lb)   05/16/18 103.9 kg (229 lb)   05/11/18 103 kg (227 lb)                  Current Outpatient Medications   Medication Sig Dispense Refill     FISH OIL        GLUCOSAMINE SULFATE PO        Iron 25 MG TABS Take  by mouth.       LUTEIN PO        MAGNESIUM OXIDE PO        MULTI-VITAMIN OR TABS   0     Probiotic Product (PROBIOTIC DAILY PO) Take 1 tablet by mouth       VITAMIN B COMPLEX OR One daily       VITAMIN D OR one daily       ZYRTEC 10 MG OR TABS ONE TABLET DAILY 0 0       Mammogram Screening: Patient over age 50, mutual decision to screen reflected in health maintenance.    Pertinent mammograms are reviewed under the imaging tab.  History of abnormal Pap smear: NO - age 30- 65 PAP every 3 years recommended  PAP / HPV Latest Ref Rng & Units 10/16/2017 12/27/2010 11/17/2008   PAP - NIL NIL NIL   HPV 16 DNA NEG:Negative Negative - -   HPV 18 DNA NEG:Negative Negative - -   OTHER HR HPV NEG:Negative Negative - -     Reviewed and updated as needed this visit by clinical staff  Tobacco  Allergies  Meds  Med Hx  Surg Hx  Fam Hx  Soc Hx        Reviewed and updated as needed this visit by Provider        Past Medical History:   Diagnosis Date     Abnormal weight gain 10/2009     Absence of menstruation 2002    LMP 2002     Chronic rhinitis      Dyspepsia and other specified disorders of function of stomach 5/2006     Osteopenia      Pure hypercholesterolemia     worse 2009     Tobacco use disorder     quit 2008     "  Past Surgical History:   Procedure Laterality Date     C DEXA INTERPRETATION, AXIAL  10/2004    WNL 12/2007 min change     C NONSPECIFIC PROCEDURE  1974    Gastric bypass     COLONOSCOPY  11/30/07    [facility]-repeat in 5yrs     COLONOSCOPY  12/17/12     [facility] COLONOSCOPY W/WO BRUSH/WASH  10/5/2004    tubular adenoma - repeat 3 years per pt (?)     HC REMV CATARACT EXTRACAP,INSERT LENS  1993       Review of Systems   Constitutional: Negative for chills and fever.   HENT: Positive for ear pain. Negative for congestion.    Eyes: Negative for pain.   Respiratory: Negative for cough.    Cardiovascular: Negative for chest pain.   Gastrointestinal: Negative for abdominal pain, constipation, diarrhea and hematochezia.   Genitourinary: Negative for hematuria.   Neurological: Negative for dizziness.   Psychiatric/Behavioral: The patient is not nervous/anxious.           OBJECTIVE:   /76   Pulse 65   Temp 97.1  F (36.2  C) (Temporal)   Resp 16   Ht 1.638 m (5' 4.5\")   Wt 113.4 kg (250 lb)   LMP 03/15/2001   SpO2 97%   BMI 42.25 kg/m    Physical Exam   Constitutional: She is oriented to person, place, and time. She appears well-developed and well-nourished. No distress.   HENT:   Right Ear: Tympanic membrane and external ear normal.   Nose: Nose normal.   Mouth/Throat: Oropharynx is clear and moist. No oropharyngeal exudate.   Left ear with some mild erythema, no discharge noted. Scabs on the outer ear    Both ears with scarring present.    Eyes: Conjunctivae are normal. Pupils are equal, round, and reactive to light. Right eye exhibits no discharge. Left eye exhibits no discharge.   Neck: Neck supple. No tracheal deviation present. No thyromegaly present.   Cardiovascular: Normal rate, regular rhythm, S1 normal, S2 normal, normal heart sounds and normal pulses. Exam reveals no S3, no S4 and no friction rub.   No murmur heard.  Pulmonary/Chest: Effort normal and breath sounds normal. No respiratory " distress. She has no wheezes. She has no rales.   Abdominal: Soft. Bowel sounds are normal. She exhibits no mass. There is no hepatosplenomegaly. There is no tenderness.   Musculoskeletal: She exhibits no edema.        Left shoulder: She exhibits decreased range of motion, tenderness, bony tenderness and pain. She exhibits no swelling.   Lymphadenopathy:     She has no cervical adenopathy.   Neurological: She is alert and oriented to person, place, and time. She has normal strength and normal reflexes. She exhibits normal muscle tone.   Skin: Skin is warm and dry. No rash noted.   Psychiatric: She has a normal mood and affect. Judgment and thought content normal. Cognition and memory are normal.         Diagnostic Test Results:  Results for orders placed or performed in visit on 02/27/19   Lipid panel reflex to direct LDL Fasting   Result Value Ref Range    Cholesterol 246 (H) <200 mg/dL    Triglycerides 113 <150 mg/dL    HDL Cholesterol 79 >49 mg/dL    LDL Cholesterol Calculated 144 (H) <100 mg/dL    Non HDL Cholesterol 167 (H) <130 mg/dL   Glucose   Result Value Ref Range    Glucose 102 (H) 70 - 99 mg/dL       ASSESSMENT/PLAN:   1. Encounter for routine adult health examination without abnormal findings  - Updated HM    2. Screening for HIV (human immunodeficiency virus)  - Will get when she does blood work in 6 months as she had her blood work done for our visit already     3. Morbid obesity (H)  - Signed up for the [facility], going to start water aerobics and walking daily now that it is summer.     4. Hyperlipidemia LDL goal <160  -The 10-year ASCVD risk score (Kandiveena MENDEZ Jr., et al., 2013) is: 4.5%    Values used to calculate the score:      Age: 63 years      Sex: Female      Is Non- : No      Diabetic: No      Tobacco smoker: No      Systolic Blood Pressure: 124 mmHg      Is BP treated: No      HDL Cholesterol: 79 mg/dL      Total Cholesterol: 246 mg/dL  - As noted above needs to work on diet  "and exercise  - Recheck lipids no OV needed for this, orders in. 6 months. Then OV in 1 year.   - Lipid Profile (Chol, Trig, HDL, LDL calc); Future    5. Chronic left shoulder pain  - Having some left sided pain, likely arthritis. Would like to see orthopedics again and have Xray. May need steroid injection.   - XR Shoulder Left G/E 3 Views  - ORTHO  REFERRAL    6. Personal history of tobacco use  - Admits to having about 1.5-2 packs daily for 20 years so estimated 40 pack history. Recommend screening. No longer smokes quit in 2009.   - Discussed benefits and risks.  - Prof Fee: Shared Decision Making Visit for Lung Cancer Screening  - CT Chest Lung Cancer Scrn Low Dose wo; Future    7. Other infective acute otitis externa of left ear  - Ofloxacin twice daily for 10 days, follow up with ENT for hearing test and possible evaluation for recurrent ear infections.   - ofloxacin (FLOXIN) 0.3 % otic solution; Place 5 drops Into the left ear daily for 10 days  Dispense: 3 mL; Refill: 0  - OTOLARYNGOLOGY REFERRAL    COUNSELING:  Reviewed preventive health counseling, as reflected in patient instructions       Regular exercise       Healthy diet/nutrition    BP Readings from Last 1 Encounters:   03/22/19 124/76     Estimated body mass index is 42.25 kg/m  as calculated from the following:    Height as of this encounter: 1.638 m (5' 4.5\").    Weight as of this encounter: 113.4 kg (250 lb).    BP Screening:   Last 3 BP Readings:    BP Readings from Last 3 Encounters:   03/22/19 124/76   05/16/18 126/85   05/11/18 112/76       The following was recommended to the patient:  Re-screen BP within a year and recommended lifestyle modifications  Weight management plan: Discussed healthy diet and exercise guidelines     reports that she quit smoking about 9 years ago. Her smoking use included cigarettes. She started smoking about 43 years ago. She has a 40.00 pack-year smoking history. she has never used smokeless " tobacco.      Counseling Resources:  ATP IV Guidelines  Pooled Cohorts Equation Calculator  Breast Cancer Risk Calculator  FRAX Risk Assessment  ICSI Preventive Guidelines  Dietary Guidelines for Americans, 2010  USDA's MyPlate  ASA Prophylaxis  Lung CA Screening    AMOR Mazariegos CNP  [facility]    Lung Cancer Screening Shared Decision Making Visit     Mirtha Marroquin is eligible for lung cancer screening on the basis of the information provided in my signed lung cancer screening order.     I have discussed with patient the risks and benefits of screening for lung cancer with low-dose CT.     The risks include:  radiation exposure: one low dose chest CT has as much ionizing radiation as about 15 chest x-rays or 6 months of background radiation living in [place]    false positives: 96% of positive findings/nodules are NOT cancer, but some might still require additional diagnostic evaluation, including biopsy  over-diagnosis: some slow growing cancers that might never have been clinically significant will be detected and treated unnecessarily     The benefit of early detection of lung cancer is contingent upon adherence to annual screening or more frequent follow up if indicated.     Furthermore, reaping the benefits of screening requires Mirtha Marroquin to be willing and physically able to undergo diagnostic procedures, if indicated. Although no specific guide is available for determining severity of comorbidities, it is reasonable to withhold screening in patients who have greater mortality risk from other diseases.     We did discuss that the only way to prevent lung cancer is to not smoke. Smoking cessation assistance was not offered. Patient has quit.     I did not offer risk estimation using a calculator such as this one:    ShouldIScreen

## 2019-03-22 ENCOUNTER — ANCILLARY PROCEDURE (OUTPATIENT)
Dept: GENERAL RADIOLOGY | Facility: OTHER | Age: 64
End: 2019-03-22
Attending: NURSE PRACTITIONER
Payer: COMMERCIAL

## 2019-03-22 ENCOUNTER — OFFICE VISIT (OUTPATIENT)
Dept: FAMILY MEDICINE | Facility: OTHER | Age: 64
End: 2019-03-22
Payer: COMMERCIAL

## 2019-03-22 VITALS
WEIGHT: 250 LBS | SYSTOLIC BLOOD PRESSURE: 124 MMHG | HEIGHT: 65 IN | HEART RATE: 65 BPM | RESPIRATION RATE: 16 BRPM | BODY MASS INDEX: 41.65 KG/M2 | OXYGEN SATURATION: 97 % | TEMPERATURE: 97.1 F | DIASTOLIC BLOOD PRESSURE: 76 MMHG

## 2019-03-22 DIAGNOSIS — H60.392 OTHER INFECTIVE ACUTE OTITIS EXTERNA OF LEFT EAR: ICD-10-CM

## 2019-03-22 DIAGNOSIS — M25.512 CHRONIC LEFT SHOULDER PAIN: ICD-10-CM

## 2019-03-22 DIAGNOSIS — Z87.891 PERSONAL HISTORY OF TOBACCO USE: ICD-10-CM

## 2019-03-22 DIAGNOSIS — Z11.4 SCREENING FOR HIV (HUMAN IMMUNODEFICIENCY VIRUS): ICD-10-CM

## 2019-03-22 DIAGNOSIS — E78.5 HYPERLIPIDEMIA LDL GOAL <160: ICD-10-CM

## 2019-03-22 DIAGNOSIS — Z00.00 ENCOUNTER FOR ROUTINE ADULT HEALTH EXAMINATION WITHOUT ABNORMAL FINDINGS: Primary | ICD-10-CM

## 2019-03-22 DIAGNOSIS — E66.01 MORBID OBESITY (H): ICD-10-CM

## 2019-03-22 DIAGNOSIS — G89.29 CHRONIC LEFT SHOULDER PAIN: ICD-10-CM

## 2019-03-22 PROCEDURE — 73030 X-RAY EXAM OF SHOULDER: CPT | Mod: LT

## 2019-03-22 PROCEDURE — G0296 VISIT TO DETERM LDCT ELIG: HCPCS | Performed by: NURSE PRACTITIONER

## 2019-03-22 PROCEDURE — 99396 PREV VISIT EST AGE 40-64: CPT | Performed by: NURSE PRACTITIONER

## 2019-03-22 RX ORDER — OFLOXACIN 3 MG/ML
5 SOLUTION AURICULAR (OTIC) DAILY
Qty: 3 ML | Refills: 0 | Status: SHIPPED | OUTPATIENT
Start: 2019-03-22 | End: 2019-04-01

## 2019-03-22 ASSESSMENT — ENCOUNTER SYMPTOMS
COUGH: 0
EYE PAIN: 0
CONSTIPATION: 0
FEVER: 0
HEMATURIA: 0
DIARRHEA: 0
ABDOMINAL PAIN: 0
DIZZINESS: 0
CHILLS: 0
HEMATOCHEZIA: 0
NERVOUS/ANXIOUS: 0

## 2019-03-22 ASSESSMENT — MIFFLIN-ST. JEOR: SCORE: 1676.93

## 2019-03-25 ENCOUNTER — TELEPHONE (OUTPATIENT)
Dept: FAMILY MEDICINE | Facility: OTHER | Age: 64
End: 2019-03-25

## 2019-03-25 DIAGNOSIS — G89.29 CHRONIC LEFT SHOULDER PAIN: Primary | ICD-10-CM

## 2019-03-25 DIAGNOSIS — M25.512 CHRONIC LEFT SHOULDER PAIN: Primary | ICD-10-CM

## 2019-03-25 NOTE — TELEPHONE ENCOUNTER
Spoke to patient and she is scheduled for her MRI and Cat Scan on 4/2/19 at 10:00 am in [place]

## 2019-03-25 NOTE — TELEPHONE ENCOUNTER
I advised patient to follow up with Orthopedics, has she scheduled with them yet? I would like to get their opinion on if she should get an MRI. Her Xray did show there could possibly be a tear in the left shoulder.     AMOR Mazariegos CNP

## 2019-03-25 NOTE — TELEPHONE ENCOUNTER
Reason for Call: Request for an order    Order or referral being requested: MRI Left Shoulder    Date needed: as soon as possible    Has the patient been seen by the PCP for this problem? YES    Additional comments: Also, want to discuss Xray Results    Phone number Patient can be reached at:  Home number on file [phone] (home)    Best Time:  anytime    Can we leave a detailed message on this number?  YES    Call taken on 3/25/2019 at 10:25 AM by Jennie Fregoso

## 2019-03-25 NOTE — TELEPHONE ENCOUNTER
Looking at Orthopedic Scheduling Dr. Ramos doesn't have an opening until April 17th. Is that okay for her to wait til then?

## 2019-03-25 NOTE — TELEPHONE ENCOUNTER
Left message for patient to return call to see to relay message below and see if she would like to do an MRI before her scheduled appointment with Dr Ramos in April.

## 2019-03-28 ENCOUNTER — ALLIED HEALTH/NURSE VISIT (OUTPATIENT)
Dept: FAMILY MEDICINE | Facility: OTHER | Age: 64
End: 2019-03-28
Payer: COMMERCIAL

## 2019-03-28 DIAGNOSIS — Z23 NEED FOR SHINGLES VACCINE: Primary | ICD-10-CM

## 2019-03-28 PROCEDURE — 90750 HZV VACC RECOMBINANT IM: CPT

## 2019-03-28 PROCEDURE — 99207 ZZC NO CHARGE NURSE ONLY: CPT

## 2019-03-28 PROCEDURE — 90471 IMMUNIZATION ADMIN: CPT

## 2019-03-28 NOTE — PROGRESS NOTES
Screening Questionnaire for Adult Immunization    Are you sick today?   No   Do you have allergies to medications, food, a vaccine component or latex?   No   Have you ever had a serious reaction after receiving a vaccination?   No   Do you have a long-term health problem with heart disease, lung disease, asthma, kidney disease, metabolic disease (e.g. diabetes), anemia, or other blood disorder?   No   Do you have cancer, leukemia, HIV/AIDS, or any other immune system problem?   No   In the past 3 months, have you taken medications that affect  your immune system, such as prednisone, other steroids, or anticancer drugs; drugs for the treatment of rheumatoid arthritis, Crohn s disease, or psoriasis; or have you had radiation treatments?   No   Have you had a seizure, or a brain or other nervous system problem?   No   During the past year, have you received a transfusion of blood or blood     products, or been given immune (gamma) globulin or antiviral drug?   No   For women: Are you pregnant or is there a chance you could become        pregnant during the next month?   No   Have you received any vaccinations in the past 4 weeks?   No     Immunization questionnaire answers were all negative.        Per orders of Jamie Heck, injection of Shingrix given by Sherly aMriscal. Patient instructed to remain in clinic for 15 minutes afterwards, and to report any adverse reaction to me immediately.       Screening performed by Sherly Mariscal on 3/28/2019 at 11:34 AM.

## 2019-04-02 ENCOUNTER — HOSPITAL ENCOUNTER (OUTPATIENT)
Dept: MRI IMAGING | Facility: CLINIC | Age: 64
Discharge: HOME | End: 2019-04-02
Attending: NURSE PRACTITIONER | Admitting: NURSE PRACTITIONER
Payer: COMMERCIAL

## 2019-04-02 ENCOUNTER — HOSPITAL ENCOUNTER (OUTPATIENT)
Dept: CT IMAGING | Facility: CLINIC | Age: 64
End: 2019-04-02
Attending: NURSE PRACTITIONER
Payer: COMMERCIAL

## 2019-04-02 DIAGNOSIS — Z87.891 PERSONAL HISTORY OF TOBACCO USE: ICD-10-CM

## 2019-04-02 DIAGNOSIS — M25.512 CHRONIC LEFT SHOULDER PAIN: ICD-10-CM

## 2019-04-02 DIAGNOSIS — G89.29 CHRONIC LEFT SHOULDER PAIN: ICD-10-CM

## 2019-04-02 PROCEDURE — G0297 LDCT FOR LUNG CA SCREEN: HCPCS

## 2019-04-02 PROCEDURE — 73221 MRI JOINT UPR EXTREM W/O DYE: CPT | Mod: LT

## 2019-04-04 ENCOUNTER — TELEPHONE (OUTPATIENT)
Dept: FAMILY MEDICINE | Facility: OTHER | Age: 64
End: 2019-04-04

## 2019-04-04 NOTE — TELEPHONE ENCOUNTER
You placed a referral for patient to ENT on 3/22/19.  Patient has not scheduled as of yet.      Please review and forward to team if follow up with the patient is needed.     Thank you!  [place]/Clinic Referrals [facility]

## 2019-04-17 ENCOUNTER — OFFICE VISIT (OUTPATIENT)
Dept: ORTHOPEDICS | Facility: OTHER | Age: 64
End: 2019-04-17
Payer: COMMERCIAL

## 2019-04-17 ENCOUNTER — ANCILLARY PROCEDURE (OUTPATIENT)
Dept: GENERAL RADIOLOGY | Facility: OTHER | Age: 64
End: 2019-04-17
Attending: ORTHOPAEDIC SURGERY
Payer: COMMERCIAL

## 2019-04-17 VITALS
DIASTOLIC BLOOD PRESSURE: 64 MMHG | SYSTOLIC BLOOD PRESSURE: 124 MMHG | BODY MASS INDEX: 42.32 KG/M2 | HEIGHT: 65 IN | WEIGHT: 254 LBS

## 2019-04-17 DIAGNOSIS — M25.512 LEFT SHOULDER PAIN: ICD-10-CM

## 2019-04-17 DIAGNOSIS — M12.812 ROTATOR CUFF ARTHROPATHY OF LEFT SHOULDER: Primary | ICD-10-CM

## 2019-04-17 PROCEDURE — 99243 OFF/OP CNSLTJ NEW/EST LOW 30: CPT | Mod: 25 | Performed by: ORTHOPAEDIC SURGERY

## 2019-04-17 PROCEDURE — 73030 X-RAY EXAM OF SHOULDER: CPT | Mod: LT

## 2019-04-17 PROCEDURE — 20610 DRAIN/INJ JOINT/BURSA W/O US: CPT | Mod: LT | Performed by: ORTHOPAEDIC SURGERY

## 2019-04-17 RX ORDER — TRIAMCINOLONE ACETONIDE 40 MG/ML
40 INJECTION, SUSPENSION INTRA-ARTICULAR; INTRAMUSCULAR ONCE
Status: COMPLETED | OUTPATIENT
Start: 2019-04-17 | End: 2019-04-17

## 2019-04-17 RX ADMIN — TRIAMCINOLONE ACETONIDE 40 MG: 40 INJECTION, SUSPENSION INTRA-ARTICULAR; INTRAMUSCULAR at 14:26

## 2019-04-17 ASSESSMENT — MIFFLIN-ST. JEOR: SCORE: 1695.08

## 2019-04-17 NOTE — PROGRESS NOTES
Prior to injection, verified patient identity using patient's name and date of birth.  Due to injection administration, patient instructed to remain in clinic for 15 minutes  afterwards, and to report any adverse reaction to me immediately.    Joint injection was performed.      Drug Amount Wasted:  None.  Vial/Syringe: Single dose vial  Expiration Date:  9/2020  The following medication was given by Ke Vinson PA-C:     MEDICATION: Kenalog 40mg/1ml  ROUTE: Joint Injection  SITE: left shoulder  DOSE: 1 mL  LOT #: [id number]  : Filtr8  EXPIRATION DATE:  9/2020  NDC: [address]-0

## 2019-04-17 NOTE — LETTER
"    4/17/2019         RE: Mirtha Marroquin  [address]  [facility] [address]        Dear Colleague,    Thank you for referring your patient, Mirtha Marroquin, to the [facility]. Please see a copy of my visit note below.    Prior to injection, verified patient identity using patient's name and date of birth.  Due to injection administration, patient instructed to remain in clinic for 15 minutes  afterwards, and to report any adverse reaction to me immediately.    Joint injection was performed.      Drug Amount Wasted:  None.  Vial/Syringe: Single dose vial  Expiration Date:  9/2020  The following medication was given by Ke Vinson PA-C:     MEDICATION: Kenalog 40mg/1ml  ROUTE: Joint Injection  SITE: left shoulder  DOSE: 1 mL  LOT #: [id number]  : Naartjie  EXPIRATION DATE:  9/2020  NDC: [address]-9                  ORTHOPEDIC CONSULT      Chief Complaint: Mirtha Marroquin is a 63 year old female who is being seen for Chief Complaint   Patient presents with     Shoulder Pain     left shoulder pain     Consult     ref: Domi Hinton        History of Present Illness:   Mirtha Marroquin is a 63 year old female who is seen in consultation at the request of Domi Hinton for evaluation of left shoulder pain.  Mechanism of Injury: No trauma or inciting event. Slowly progressive left shoulder pain for many years. 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    Location: left shoulder lateral, superior  Duration of Pain:  \"many years\"  Rating of Pain:  Off and on.  When off, no pain, when on moderate to severe.    Pain Quality: sharp, stabbing at times.   Pain is better with: ibuprofen, rest, activity modification.   Pain is worse with:  reaching above the head, reaching behind, lifting.  Treatment so far consists of:  Rest, activity modification,   Associated Features: weakness with lifting. Denies " numbness, radiating pain.  Prior history of related problems:   Has had similar problems in the right shoulder, was seen by another orthopedic and told she had rotator cuffs and arthritis. She stopped using the right shoulder, starting using the left shoulder exclusively and now the left shoulder is worse.     Denies diabetes and smoking hx. No use of blood thinners.     Patient's past medical, surgical, social and family histories reviewed.     Past Medical History:   Diagnosis Date     Abnormal weight gain 10/2009     Absence of menstruation     LMP      Chronic rhinitis      Dyspepsia and other specified disorders of function of stomach 2006     Osteopenia      Pure hypercholesterolemia     worse      Tobacco use disorder     quit        Past Surgical History:   Procedure Laterality Date     C DEXA INTERPRETATION, AXIAL  10/2004    WNL 2007 min change     C NONSPECIFIC PROCEDURE  1974    Gastric bypass     COLONOSCOPY  07    [facility]-repeat in 5yrs     COLONOSCOPY  12     [facility] COLONOSCOPY W/WO BRUSH/WASH  10/5/2004    tubular adenoma - repeat 3 years per pt (?)     HC REMV CATARACT EXTRACAP,INSERT LENS         Medications:    Current Outpatient Medications on File Prior to Visit:  FISH OIL    GLUCOSAMINE SULFATE PO    Iron 25 MG TABS Take  by mouth.   LUTEIN PO    MAGNESIUM OXIDE PO    MULTI-VITAMIN OR TABS    [] ofloxacin (FLOXIN) 0.3 % otic solution Place 5 drops Into the left ear daily for 10 days   Probiotic Product (PROBIOTIC DAILY PO) Take 1 tablet by mouth   VITAMIN B COMPLEX OR One daily   VITAMIN D OR one daily   ZYRTEC 10 MG OR TABS ONE TABLET DAILY     No current facility-administered medications on file prior to visit.     Allergies   Allergen Reactions     No Known Drug Allergies        Social History     Occupational History     Occupation:      Employer: [employer]   Tobacco Use     Smoking status: Former Smoker      "Packs/day: 2.00     Years: 20.00     Pack years: 40.00     Types: Cigarettes     Start date: 1976     Last attempt to quit: 2009     Years since quittin.6     Smokeless tobacco: Never Used   Substance and Sexual Activity     Alcohol use: Yes     Comment: not much, beer once a month     Drug use: No     Sexual activity: Not Currently     Partners: Male       Family History   Problem Relation Age of Onset     Diabetes Mother      Hypertension Mother      Cancer Mother         cervical     Cerebrovascular Disease Mother      Cancer - colorectal Paternal Grandfather      Cancer - colorectal Paternal Aunt      Heart Disease Maternal Grandfather         heart attack     Heart Disease Sister         heart surgery birth defect     Respiratory Father          of pneumonia age 64       REVIEW OF SYSTEMS  10 point review systems performed otherwise negative as noted as per history of present illness.    Physical Exam:  Vitals: /64   Ht 1.638 m (5' 4.5\")   Wt 115.2 kg (254 lb)   LMP 03/15/2001   BMI 42.93 kg/m     BMI= Body mass index is 42.93 kg/m .  Constitutional: healthy, alert and no acute distress   Psychiatric: mentation appears normal and affect normal/bright  NEURO: no focal deficits  RESP: Normal with easy respirations and no use of accessory muscles to breathe, no audible wheezing or retractions  CV: LUE:   no edema  SKIN: No erythema, rashes, excoriation, or breakdown. No evidence of infection.   Regular rate and rhythm for pulse  JOINT/EXTREMITIES:left shoulder: LUE: no atrophy, deformity, swelling.,  No focal tenderness. No bony tenderness. AROM: 120/115/40/back pocket. PROM: unable to achieve >130/130 due to pain.  Moves shoulder as one unit.  Weakness and pain with supraspinatus, no weakness or pain with infraspinatus.  Negative lift-off. Positive empty can.  Negative cross body. [place] \"felt good\".  Negative speed's.      Lymph: no appreciated LUE lymphedema  GAIT: not tested "     Diagnostic Modalities:  Left shoulder xray: High riding humeral head, rides against inferior aspect of AC joint.  Glenohumeral Joint space narrowing. AC joint arthritis.   Left shoulder MRI reviewed: full thickness supraspinatus, infraspinatus tears with retraction and atrophy. Partial thickness subscapularis tear.  Proximal long head bicep tendon rupture. Grade 4 chondromalacia.    Independent visualization of the images was performed.      Impression: left shoulder rotator cuff arthropathy   Plan:  All of the above pertinent physical exam and imaging modalities findings was reviewed with Mirtha.  Imaging, history, consistent with rotator cuff arthropathy. No acute injury. Long hx of shoulder issues stemming from years of work as a .  Explained that surgically would recommended a reverse total shoulder replacement. However, recommended to start with conservative therapy.      I recommend conservative care for the patient to include formal physical therapy, steroid injections, activity modifications, rest. Today I provided or dispensed physical therapy.    The patient was counseled about an  injection, including discussion of risks (including infection), contents of the injection, rationale for performing the injection, and expected benefits of the injection. The skin was prepped with alcohol and betadine and then utilizing sterile technique an injection of the left shoulder subacromial space from the posterolateral approach  was performed. The injection consisted 1ml of Kenalog (40mg per 1 ml) mixed with 3ml of 0.5% Marcaine. The patient tolerated the injection well, and there were no complications. The injection site was covered with a Band-Aid. The injection was performed by ESAU Romero      If any concerns for infection seek immediate medical evaluation either in the clinic or the ED.         Return to clinic in 4-6 weeks if not improving, PRN, or sooner as needed for changes.  Re-x-ray on return:  No    Scribed by:  Chalino Del Cid, APRN, CNP  2:21 PM  4/17/2019    I attest I have seen and evaluated the patient.  I agree with above impression and plan.  Elliott Ramos D.O.    Again, thank you for allowing me to participate in the care of your patient.        Sincerely,        Oscar Ramos, DO

## 2019-04-17 NOTE — PROGRESS NOTES
"ORTHOPEDIC CONSULT      Chief Complaint: Mirtha Marroquin is a 63 year old female who is being seen for Chief Complaint   Patient presents with     Shoulder Pain     left shoulder pain     Consult     ref: Domi Hinton        History of Present Illness:   Mirtha Marroquin is a 63 year old female who is seen in consultation at the request of Domi Hinton for evaluation of left shoulder pain.  Mechanism of Injury: No trauma or inciting event. Slowly progressive left shoulder pain for many years. 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    Location: left shoulder lateral, superior  Duration of Pain:  \"many years\"  Rating of Pain:  Off and on.  When off, no pain, when on moderate to severe.    Pain Quality: sharp, stabbing at times.   Pain is better with: ibuprofen, rest, activity modification.   Pain is worse with:  reaching above the head, reaching behind, lifting.  Treatment so far consists of:  Rest, activity modification,   Associated Features: weakness with lifting. Denies numbness, radiating pain.  Prior history of related problems:   Has had similar problems in the right shoulder, was seen by another orthopedic and told she had rotator cuffs and arthritis. She stopped using the right shoulder, starting using the left shoulder exclusively and now the left shoulder is worse.     Denies diabetes and smoking hx. No use of blood thinners.     Patient's past medical, surgical, social and family histories reviewed.     Past Medical History:   Diagnosis Date     Abnormal weight gain 10/2009     Absence of menstruation 2002    LMP 2002     Chronic rhinitis      Dyspepsia and other specified disorders of function of stomach 5/2006     Osteopenia      Pure hypercholesterolemia     worse 2009     Tobacco use disorder     quit 2008       Past Surgical History:   Procedure Laterality Date     C DEXA INTERPRETATION, AXIAL  10/2004    WNL " 2007 min change     C NONSPECIFIC PROCEDURE  1974    Gastric bypass     COLONOSCOPY  07    [facility]-repeat in 5yrs     COLONOSCOPY  12     [facility] COLONOSCOPY W/WO BRUSH/WASH  10/5/2004    tubular adenoma - repeat 3 years per pt (?)     [facility] REMV CATARACT EXTRACAP,INSERT LENS         Medications:    Current Outpatient Medications on File Prior to Visit:  FISH OIL    GLUCOSAMINE SULFATE PO    Iron 25 MG TABS Take  by mouth.   LUTEIN PO    MAGNESIUM OXIDE PO    MULTI-VITAMIN OR TABS    [] ofloxacin (FLOXIN) 0.3 % otic solution Place 5 drops Into the left ear daily for 10 days   Probiotic Product (PROBIOTIC DAILY PO) Take 1 tablet by mouth   VITAMIN B COMPLEX OR One daily   VITAMIN D OR one daily   ZYRTEC 10 MG OR TABS ONE TABLET DAILY     No current facility-administered medications on file prior to visit.     Allergies   Allergen Reactions     No Known Drug Allergies        Social History     Occupational History     Occupation:      Employer: [employer]   Tobacco Use     Smoking status: Former Smoker     Packs/day: 2.00     Years: 20.00     Pack years: 40.00     Types: Cigarettes     Start date: 1976     Last attempt to quit: 2009     Years since quittin.6     Smokeless tobacco: Never Used   Substance and Sexual Activity     Alcohol use: Yes     Comment: not much, beer once a month     Drug use: No     Sexual activity: Not Currently     Partners: Male       Family History   Problem Relation Age of Onset     Diabetes Mother      Hypertension Mother      Cancer Mother         cervical     Cerebrovascular Disease Mother      Cancer - colorectal Paternal Grandfather      Cancer - colorectal Paternal Aunt      Heart Disease Maternal Grandfather         heart attack     Heart Disease Sister         heart surgery birth defect     Respiratory Father          of pneumonia age 64       REVIEW OF SYSTEMS  10 point review systems performed otherwise  "negative as noted as per history of present illness.    Physical Exam:  Vitals: /64   Ht 1.638 m (5' 4.5\")   Wt 115.2 kg (254 lb)   LMP 03/15/2001   BMI 42.93 kg/m    BMI= Body mass index is 42.93 kg/m .  Constitutional: healthy, alert and no acute distress   Psychiatric: mentation appears normal and affect normal/bright  NEURO: no focal deficits  RESP: Normal with easy respirations and no use of accessory muscles to breathe, no audible wheezing or retractions  CV: LUE:   no edema  SKIN: No erythema, rashes, excoriation, or breakdown. No evidence of infection.   Regular rate and rhythm for pulse  JOINT/EXTREMITIES:left shoulder: LUE: no atrophy, deformity, swelling.,  No focal tenderness. No bony tenderness. AROM: 120/115/40/back pocket. PROM: unable to achieve >130/130 due to pain.  Moves shoulder as one unit.  Weakness and pain with supraspinatus, no weakness or pain with infraspinatus.  Negative lift-off. Positive empty can.  Negative cross body. [place] \"felt good\".  Negative speed's.      Lymph: no appreciated LUE lymphedema  GAIT: not tested     Diagnostic Modalities:  Left shoulder xray: High riding humeral head, rides against inferior aspect of AC joint.  Glenohumeral Joint space narrowing. AC joint arthritis.   Left shoulder MRI reviewed: full thickness supraspinatus, infraspinatus tears with retraction and atrophy. Partial thickness subscapularis tear.  Proximal long head bicep tendon rupture. Grade 4 chondromalacia.    Independent visualization of the images was performed.      Impression: left shoulder rotator cuff arthropathy   Plan:  All of the above pertinent physical exam and imaging modalities findings was reviewed with Mirtha.  Imaging, history, consistent with rotator cuff arthropathy. No acute injury. Long hx of shoulder issues stemming from years of work as a .  Explained that surgically would recommended a reverse total shoulder replacement. However, recommended to start with " conservative therapy.      I recommend conservative care for the patient to include formal physical therapy, steroid injections, activity modifications, rest. Today I provided or dispensed physical therapy.    The patient was counseled about an  injection, including discussion of risks (including infection), contents of the injection, rationale for performing the injection, and expected benefits of the injection. The skin was prepped with alcohol and betadine and then utilizing sterile technique an injection of the left shoulder subacromial space from the posterolateral approach  was performed. The injection consisted 1ml of Kenalog (40mg per 1 ml) mixed with 3ml of 0.5% Marcaine. The patient tolerated the injection well, and there were no complications. The injection site was covered with a Band-Aid. The injection was performed by ESAU Romero      If any concerns for infection seek immediate medical evaluation either in the clinic or the ED.         Return to clinic in 4-6 weeks if not improving, PRN, or sooner as needed for changes.  Re-x-ray on return: No    Scribed by:  AMOR Sprague, CNP  2:21 PM  4/17/2019    I attest I have seen and evaluated the patient.  I agree with above impression and plan.  Elliott Ramos D.O.

## 2019-05-08 ENCOUNTER — THERAPY VISIT (OUTPATIENT)
Dept: PHYSICAL THERAPY | Facility: CLINIC | Age: 64
End: 2019-05-08
Attending: NURSE PRACTITIONER
Payer: COMMERCIAL

## 2019-05-08 DIAGNOSIS — M25.512 CHRONIC LEFT SHOULDER PAIN: ICD-10-CM

## 2019-05-08 DIAGNOSIS — G89.29 CHRONIC LEFT SHOULDER PAIN: ICD-10-CM

## 2019-05-08 DIAGNOSIS — M12.812 ROTATOR CUFF ARTHROPATHY OF LEFT SHOULDER: ICD-10-CM

## 2019-05-08 PROCEDURE — 97161 PT EVAL LOW COMPLEX 20 MIN: CPT | Mod: GP | Performed by: PHYSICAL THERAPIST

## 2019-05-08 PROCEDURE — 97110 THERAPEUTIC EXERCISES: CPT | Mod: GP | Performed by: PHYSICAL THERAPIST

## 2019-05-08 NOTE — PROGRESS NOTES
[facility] Initial Evaluation  Subjective:  Pt with primary complaint of L shoulder pain. Pt states she has pain primarily with elevation of the arm, use of the arm, no pain at rest. Feel due to her occupation as a  for 20 years. Pain at rest 0/10, overhead reaching and use to 4-5/10 level. Had a MRI done demonstrating complete tears of the supraspinatus, infraspinatus tendons, degenerative changes. Pt to trial PT with initial ROM and gradual progression to strengthening, starting below shoulder level. Referral dated 4-17-19.     The history is provided by the patient. No  was used.   Mirtha Marroquin is a 63 year old female with a left shoulder condition.  Condition occurred with:  Repetition/overuse.  Condition occurred: for unknown reasons.  This is a chronic condition     Patient reports pain:  Lateral and in the joint.  Radiates to:  Shoulder.  Pain is described as aching and is intermittent and reported as 4/10 and 5/10.  Associated symptoms:  Loss of motion/stiffness, loss of strength and catching. Pain is the same all the time.  Symptoms are exacerbated by using arm at shoulder level and using arm overhead and relieved by rest (OTC pain ).  Since onset symptoms are unchanged.  Special tests:  MRI.      General health as reported by patient is fair.  Pertinent medical history includes:  Overweight, implaned devices and menopausal.  Medical allergies: no.  Other surgeries include:  Other.  Current medications:  None as reported by the patient.  Current occupation is Retired .    Primary job tasks include:  Driving, lifting, prolonged sitting and repetitive tasks.    Barriers include:  None as reported by the patient.    Red flags:  None as reported by the patient.                        Objective:  Standing Alignment:    Cervical/Thoracic:  Forward head  Shoulder/UE:  Rounded shoulders                                       Shoulder Evaluation:  ROM:  AROM:     Flexion:  Left:  98    Right:  140    Abduction:  Left: 98   Right:  138      External Rotation:  Left:  42    Right:  60            Extension/Internal Rotation:  Left:  L1    Right:  T9          Strength:    Flexion: Left:4+/5   Pain:    Right: 5-/5     Pain:     Abduction:  Left: 3/5  Pain:    Right: 5-/5     Pain:    Internal Rotation:  Left:5/5     Pain:    Right: 5/5     Pain:  External Rotation:   Left:4/5     Pain:   Right:4+/5     Pain:            Stability Testing:      Left shoulder stability negative testing:  Sulcus sign; Load and shift anterior and Load and shift posterior    Special Tests:    Left shoulder positive for the following special tests:  Impingement and Rotator cuff tear  Left shoulder negative for the following special tests:  Labral and Neural Tension    Palpation:    Left shoulder tenderness present at:  Teres Minor  Left shoulder tenderness not present at: Clavicle; Sternoclavicular; Acrimioclavicular; Biceps; Triceps; Supraspinatus; Infraspinatus; Subscapularis; Deltoid; Levator; Rhomboids; Upper Trap or Bicipital Groove    Mobility Tests:  Mobility wnl shoulder: scapulo humeral near 1:1 ratio.                                                 General     ROS    Assessment/Plan:    Patient is a 63 year old female with left side shoulder complaints.    Patient has the following significant findings with corresponding treatment plan.                Diagnosis 1:  L shoulder pain   Pain -  hot/cold therapy, manual therapy, self management, education, directional preference exercise and home program  Decreased ROM/flexibility - manual therapy, therapeutic exercise and home program  Decreased joint mobility - manual therapy, therapeutic exercise and home program  Decreased strength - therapeutic exercise, therapeutic activities and home program  Inflammation - cold therapy and self management/home program  Impaired muscle performance - neuro re-education and home program  Decreased function -  therapeutic activities and home program  Impaired posture - neuro re-education and home program    Therapy Evaluation Codes:   1) History comprised of:   Personal factors that impact the plan of care:      Time since onset of symptoms.    Comorbidity factors that impact the plan of care are:      Overweight.     Medications impacting care: None.  2) Examination of Body Systems comprised of:   Body structures and functions that impact the plan of care:      Shoulder.   Activity limitations that impact the plan of care are:      Dressing, Lifting, Reading/Computer work and reaching, carrying .  3) Clinical presentation characteristics are:   Stable/Uncomplicated.  4) Decision-Making    Low complexity using standardized patient assessment instrument and/or measureable assessment of functional outcome.  Cumulative Therapy Evaluation is: Low complexity.    Previous and current functional limitations:  (See Goal Flow Sheet for this information)    Short term and Long term goals: (See Goal Flow Sheet for this information)     Communication ability:  Patient appears to be able to clearly communicate and understand verbal and written communication and follow directions correctly.  Treatment Explanation - The following has been discussed with the patient:   RX ordered/plan of care  Anticipated outcomes  Possible risks and side effects  This patient would benefit from PT intervention to resume normal activities.   Rehab potential is good.    Frequency:  1 X week, once daily  Duration:  for 8 weeks  Discharge Plan:  Achieve all LTG.  Independent in home treatment program.  Reach maximal therapeutic benefit.    Please refer to the daily flowsheet for treatment today, total treatment time and time spent performing 1:1 timed codes.

## 2019-05-10 ENCOUNTER — THERAPY VISIT (OUTPATIENT)
Dept: PHYSICAL THERAPY | Facility: CLINIC | Age: 64
End: 2019-05-10
Attending: NURSE PRACTITIONER
Payer: COMMERCIAL

## 2019-05-10 DIAGNOSIS — G89.29 CHRONIC LEFT SHOULDER PAIN: ICD-10-CM

## 2019-05-10 DIAGNOSIS — M25.512 CHRONIC LEFT SHOULDER PAIN: ICD-10-CM

## 2019-05-10 PROCEDURE — 97140 MANUAL THERAPY 1/> REGIONS: CPT | Mod: GP | Performed by: PHYSICAL THERAPIST

## 2019-05-10 PROCEDURE — 97110 THERAPEUTIC EXERCISES: CPT | Mod: GP | Performed by: PHYSICAL THERAPIST

## 2019-05-15 ENCOUNTER — THERAPY VISIT (OUTPATIENT)
Dept: PHYSICAL THERAPY | Facility: CLINIC | Age: 64
End: 2019-05-15
Attending: NURSE PRACTITIONER
Payer: COMMERCIAL

## 2019-05-15 DIAGNOSIS — M25.512 CHRONIC LEFT SHOULDER PAIN: ICD-10-CM

## 2019-05-15 DIAGNOSIS — G89.29 CHRONIC LEFT SHOULDER PAIN: ICD-10-CM

## 2019-05-15 PROCEDURE — 97112 NEUROMUSCULAR REEDUCATION: CPT | Mod: GP | Performed by: PHYSICAL THERAPIST

## 2019-05-15 PROCEDURE — 97140 MANUAL THERAPY 1/> REGIONS: CPT | Mod: GP | Performed by: PHYSICAL THERAPIST

## 2019-05-15 PROCEDURE — 97110 THERAPEUTIC EXERCISES: CPT | Mod: GP | Performed by: PHYSICAL THERAPIST

## 2019-06-05 ENCOUNTER — THERAPY VISIT (OUTPATIENT)
Dept: PHYSICAL THERAPY | Facility: CLINIC | Age: 64
End: 2019-06-05
Payer: COMMERCIAL

## 2019-06-05 DIAGNOSIS — M25.512 CHRONIC LEFT SHOULDER PAIN: ICD-10-CM

## 2019-06-05 DIAGNOSIS — G89.29 CHRONIC LEFT SHOULDER PAIN: ICD-10-CM

## 2019-06-05 PROCEDURE — 97140 MANUAL THERAPY 1/> REGIONS: CPT | Mod: GP | Performed by: PHYSICAL THERAPIST

## 2019-06-05 PROCEDURE — 97112 NEUROMUSCULAR REEDUCATION: CPT | Mod: GP | Performed by: PHYSICAL THERAPIST

## 2019-06-05 PROCEDURE — 97110 THERAPEUTIC EXERCISES: CPT | Mod: GP | Performed by: PHYSICAL THERAPIST

## 2019-10-09 PROBLEM — M25.512 CHRONIC LEFT SHOULDER PAIN: Status: RESOLVED | Noted: 2019-05-08 | Resolved: 2019-10-09

## 2019-10-09 PROBLEM — G89.29 CHRONIC LEFT SHOULDER PAIN: Status: RESOLVED | Noted: 2019-05-08 | Resolved: 2019-10-09

## 2019-10-09 NOTE — PROGRESS NOTES
"Discharge Note    Progress reporting period is from initial evaluation date (please see noted date below) to Jun 5, 2019.  Linked Episodes   Type: Episode: Status: Noted: Resolved: Last update: Updated by:   PHYSICAL THERAPY L shoulder pain 5-8-19 Active 5/8/2019 6/5/2019 10:56 AM Javid Pizarro PT      Comments:       Mirtha failed to follow up and current status is unknown.  Please see information below for last relevant information on current status.  Patient seen for 4 visits.    SUBJECTIVE  Subjective changes noted by patient:  Set up pulley system at home, cutting grass, able to reach higher items at home, \"not feeling as tight\"  .  Current pain level is 2/10(w/ movement Simultaneous filing. User may not have seen previous data.).     Previous pain level was  (3-5/10).   Changes in function:  Yes (See Goal flowsheet attached for changes in current functional level)  Adverse reaction to treatment or activity: None    OBJECTIVE  Changes noted in objective findings: AROM: Flex 120 +pain, abd 112 +pain; ExtIR T8     ASSESSMENT/PLAN  Diagnosis: L shoulder pain    Updated problem list and treatment plan:   Pain - HEP  Decreased ROM/flexibility - HEP  Decreased function - HEP  Decreased strength - HEP  Impaired muscle performance - HEP  Impaired posture - HEP  Decreased joint mobility - HEP  STG/LTGs have been met or progress has been made towards goals:  Yes, please see goal flowsheet for most current information  Assessment of Progress: current status is unknown.    Last current status: Pt is progressing as expected   Self Management Plans:  HEP  I have re-evaluated this patient and find that the nature, scope, duration and intensity of the therapy is appropriate for the medical condition of the patient.  Mirtha continues to require the following intervention to meet STG and LTG's:  HEP.    Recommendations:  Discharge with current home program.  Patient to follow up with MD as needed.    Please refer to the " daily flowsheet for treatment today, total treatment time and time spent performing 1:1 timed codes.

## 2019-10-17 ENCOUNTER — ANCILLARY PROCEDURE (OUTPATIENT)
Dept: MAMMOGRAPHY | Facility: OTHER | Age: 64
End: 2019-10-17
Payer: MEDICARE

## 2019-10-17 DIAGNOSIS — E78.5 HYPERLIPIDEMIA LDL GOAL <160: ICD-10-CM

## 2019-10-17 DIAGNOSIS — Z12.31 VISIT FOR SCREENING MAMMOGRAM: ICD-10-CM

## 2019-10-17 LAB
CHOLEST SERPL-MCNC: 248 MG/DL
HDLC SERPL-MCNC: 73 MG/DL
LDLC SERPL CALC-MCNC: 149 MG/DL
NONHDLC SERPL-MCNC: 175 MG/DL
TRIGL SERPL-MCNC: 128 MG/DL

## 2019-10-17 PROCEDURE — 80061 LIPID PANEL: CPT | Performed by: NURSE PRACTITIONER

## 2019-10-17 PROCEDURE — 77067 SCR MAMMO BI INCL CAD: CPT | Mod: TC

## 2019-10-17 PROCEDURE — 36415 COLL VENOUS BLD VENIPUNCTURE: CPT | Performed by: NURSE PRACTITIONER

## 2019-11-21 ENCOUNTER — TELEPHONE (OUTPATIENT)
Dept: FAMILY MEDICINE | Facility: OTHER | Age: 64
End: 2019-11-21

## 2019-11-21 NOTE — TELEPHONE ENCOUNTER
Reason for call:  Results  Reason for Call:  Request for results:    Name of test or procedure: Labs    Date of test of procedure: 10/17/19    Location of the test or procedure: [facility]    OK to leave the result message on voice mail or with a family member? YES    Phone number Patient can be reached at:  Home number on file [phone] (home)    Additional comments: Had an appt scheduled on 10/24/19 to go over labs and mammo gram results and had to cx appt. Please mail results or call to go over them.     Call taken on 11/21/2019 at 4:54 PM by Mireya Driscoll

## 2019-11-21 NOTE — TELEPHONE ENCOUNTER
"Will route to provider pool for review.  Patient was last seen on 3/22/19 and was told to return in 6 months for \"Lab Work\" only and then to follow up in 1 year.  Patient requesting results from her LDL completed on 10/17/19.   Destiny Cruz CMA ([facility])    "

## 2019-11-22 NOTE — TELEPHONE ENCOUNTER
Called and spoke with patient regarding lab results.   Patient verbalized understanding.  Destiny Cruz CMA ([facility])

## 2020-10-05 ENCOUNTER — TELEPHONE (OUTPATIENT)
Dept: FAMILY MEDICINE | Facility: OTHER | Age: 65
End: 2020-10-05

## 2020-10-05 DIAGNOSIS — Z13.1 SCREENING FOR DIABETES MELLITUS: ICD-10-CM

## 2020-10-05 DIAGNOSIS — E78.5 HYPERLIPIDEMIA LDL GOAL <160: ICD-10-CM

## 2020-10-05 DIAGNOSIS — Z12.31 ENCOUNTER FOR SCREENING MAMMOGRAM FOR BREAST CANCER: Primary | ICD-10-CM

## 2020-10-05 NOTE — TELEPHONE ENCOUNTER
Reason for Call:  Other Referral for Labs/Mamogram    Detailed comments: Patient is requesting to have lab orders and Mamogram order faxed to [phone]. [facility] [place]. Thank you    Phone Number Patient can be reached at: Home number on file [phone] (home)    Best Time: anytime    Can we leave a detailed message on this number? YES    Call taken on 10/5/2020 at 12:13 PM by Mireya Balatzar

## 2020-10-06 NOTE — TELEPHONE ENCOUNTER
Yes, this is fine. I ordered labs and mammogram. Please fax.    Jamie Heck PA-C  [facility]

## 2020-10-06 NOTE — TELEPHONE ENCOUNTER
I don't see labs or mammogram ordered. Are you okay with placing orders and then having us fax over?

## 2020-11-02 ENCOUNTER — TRANSFERRED RECORDS (OUTPATIENT)
Dept: HEALTH INFORMATION MANAGEMENT | Facility: CLINIC | Age: 65
End: 2020-11-02

## 2020-12-21 ENCOUNTER — TELEPHONE (OUTPATIENT)
Dept: FAMILY MEDICINE | Facility: OTHER | Age: 65
End: 2020-12-21

## 2020-12-21 NOTE — LETTER
[facility]  [address]  [place] [address]  [phone]          December 23, 2020    Mirtha Marroquin                                                                                                                     [address] [place] [id number]            Dear Mirtha,  Here are the low fat diet information we talked about.  If you have any other concerns please feel free to call and we can address these.    Constance Baltazar CMA ([facility])  Family Practice -Dean Mills PA-C      Sincerely,         Dean Mills PA-C

## 2020-12-21 NOTE — TELEPHONE ENCOUNTER
Reason for Call:  Request for results: Pt would like a call back and results from 11/02/2020 lab apt. Pt can be reached at [phone] and also please mail a copy to her home address on file.        Name of test or procedure: Pt is looking for Lipid Panel Results and Glucose    Date of test of procedure: 11/02/2020    Location of the test or procedure: In [facility]    OK to leave the result message on voice mail or with a family member? YES    Phone number Patient can be reached at:  Home number on file [phone] (home)    Additional comments: N/A    Call taken on 12/21/2020 at 12:45 PM by Magy Radford

## 2020-12-21 NOTE — TELEPHONE ENCOUNTER
CDL patient. Labs done at outside facility. Please see if we can still mail results. Cholesterol and glucose were both slightly elevated so I recommend a low fat diet with routine exercise for 30+ minutes 4-5 days per week.    Livan Mills PA-C

## 2020-12-21 NOTE — TELEPHONE ENCOUNTER
Patient informed of the results and she states you can mail the results. She is asking if you have information on the low fat diet and you can mail that off also.

## 2020-12-21 NOTE — TELEPHONE ENCOUNTER
Provider please review and advise. Looks like these were done via [facility] and can see in Care Everywhere.     Emely Lopez CMA ([facility])

## 2020-12-23 NOTE — TELEPHONE ENCOUNTER
I spoke to Mirtha and sent her the low fat diet information from our resources.  She will contact [facility] where her labs were drawn to get copies of those records.  I explained being we didn't draw them we can't give out that information.  She understood.  Constance Baltazar CMA ([facility])

## 2021-10-21 ENCOUNTER — TELEPHONE (OUTPATIENT)
Dept: FAMILY MEDICINE | Facility: OTHER | Age: 66
End: 2021-10-21

## 2021-10-21 DIAGNOSIS — E78.5 HYPERLIPIDEMIA LDL GOAL <160: Primary | ICD-10-CM

## 2021-10-21 DIAGNOSIS — Z13.1 SCREENING FOR DIABETES MELLITUS: ICD-10-CM

## 2021-10-21 NOTE — TELEPHONE ENCOUNTER
Reason for Call: Request for an order or referral: blood work     Order or referral being requested: Mirtha would like to get orders for blood work for her physical. Would also like a call back once the orders are in to schedule an appt.     Date needed: as soon as possible    Has the patient been seen by the PCP for this problem? YES    Additional comments:     Phone number Patient can be reached at:  Home number on file [phone] (home)    Best Time:  Anytime    Can we leave a detailed message on this number?  YES    Call taken on 10/21/2021 at 1:02 PM by Kj Dickey

## 2021-10-22 NOTE — TELEPHONE ENCOUNTER
Pt scheduled. States last year she got her mammo somewhere else and that they are sending images   Noemy Mcintyre MA

## 2021-11-30 ENCOUNTER — LAB (OUTPATIENT)
Dept: LAB | Facility: OTHER | Age: 66
End: 2021-11-30
Payer: MEDICARE

## 2021-11-30 ENCOUNTER — ANCILLARY PROCEDURE (OUTPATIENT)
Dept: MAMMOGRAPHY | Facility: OTHER | Age: 66
End: 2021-11-30
Attending: PHYSICIAN ASSISTANT
Payer: MEDICARE

## 2021-11-30 DIAGNOSIS — Z12.31 ENCOUNTER FOR SCREENING MAMMOGRAM FOR BREAST CANCER: ICD-10-CM

## 2021-11-30 DIAGNOSIS — E78.5 HYPERLIPIDEMIA LDL GOAL <160: ICD-10-CM

## 2021-11-30 DIAGNOSIS — Z13.1 SCREENING FOR DIABETES MELLITUS: ICD-10-CM

## 2021-11-30 LAB
ANION GAP SERPL CALCULATED.3IONS-SCNC: 5 MMOL/L (ref 3–14)
BUN SERPL-MCNC: 17 MG/DL (ref 7–30)
CALCIUM SERPL-MCNC: 9.6 MG/DL (ref 8.5–10.1)
CHLORIDE BLD-SCNC: 105 MMOL/L (ref 94–109)
CHOLEST SERPL-MCNC: 227 MG/DL
CO2 SERPL-SCNC: 28 MMOL/L (ref 20–32)
CREAT SERPL-MCNC: 0.78 MG/DL (ref 0.52–1.04)
FASTING STATUS PATIENT QL REPORTED: YES
GFR SERPL CREATININE-BSD FRML MDRD: 79 ML/MIN/1.73M2
GLUCOSE BLD-MCNC: 107 MG/DL (ref 70–99)
HDLC SERPL-MCNC: 69 MG/DL
LDLC SERPL CALC-MCNC: 136 MG/DL
NONHDLC SERPL-MCNC: 158 MG/DL
POTASSIUM BLD-SCNC: 4.6 MMOL/L (ref 3.4–5.3)
SODIUM SERPL-SCNC: 138 MMOL/L (ref 133–144)
TRIGL SERPL-MCNC: 108 MG/DL

## 2021-11-30 PROCEDURE — 36415 COLL VENOUS BLD VENIPUNCTURE: CPT

## 2021-11-30 PROCEDURE — 77067 SCR MAMMO BI INCL CAD: CPT | Mod: TC | Performed by: RADIOLOGY

## 2021-11-30 PROCEDURE — 80048 BASIC METABOLIC PNL TOTAL CA: CPT

## 2021-11-30 PROCEDURE — 77063 BREAST TOMOSYNTHESIS BI: CPT | Mod: TC | Performed by: RADIOLOGY

## 2021-11-30 PROCEDURE — 80061 LIPID PANEL: CPT

## 2022-11-30 ENCOUNTER — DOCUMENTATION ONLY (OUTPATIENT)
Dept: LAB | Facility: OTHER | Age: 67
End: 2022-11-30

## 2022-11-30 DIAGNOSIS — E78.5 HYPERLIPIDEMIA LDL GOAL <160: Primary | ICD-10-CM

## 2022-11-30 DIAGNOSIS — Z98.0 INTESTINAL BYPASS OR ANASTOMOSIS STATUS: ICD-10-CM

## 2022-11-30 NOTE — PROGRESS NOTES
Patient is coming in for lab for her physical with you on 12-3  Please place orders as needed   thank you.  Roseann SWAIN) [facility]

## 2023-10-23 ENCOUNTER — LAB (OUTPATIENT)
Dept: LAB | Facility: OTHER | Age: 68
End: 2023-10-23
Payer: MEDICARE

## 2023-10-23 DIAGNOSIS — E78.5 HYPERLIPIDEMIA LDL GOAL <160: ICD-10-CM

## 2023-10-23 DIAGNOSIS — Z98.0 INTESTINAL BYPASS OR ANASTOMOSIS STATUS: ICD-10-CM

## 2023-10-23 LAB
ANION GAP SERPL CALCULATED.3IONS-SCNC: 15 MMOL/L (ref 7–15)
BUN SERPL-MCNC: 15.9 MG/DL (ref 8–23)
CALCIUM SERPL-MCNC: 9.7 MG/DL (ref 8.8–10.2)
CHLORIDE SERPL-SCNC: 100 MMOL/L (ref 98–107)
CHOLEST SERPL-MCNC: 258 MG/DL
CREAT SERPL-MCNC: 0.89 MG/DL (ref 0.51–0.95)
DEPRECATED HCO3 PLAS-SCNC: 24 MMOL/L (ref 22–29)
EGFRCR SERPLBLD CKD-EPI 2021: 70 ML/MIN/1.73M2
GLUCOSE SERPL-MCNC: 111 MG/DL (ref 70–99)
HDLC SERPL-MCNC: 61 MG/DL
LDLC SERPL CALC-MCNC: 162 MG/DL
NONHDLC SERPL-MCNC: 197 MG/DL
POTASSIUM SERPL-SCNC: 4.6 MMOL/L (ref 3.4–5.3)
SODIUM SERPL-SCNC: 139 MMOL/L (ref 135–145)
TRIGL SERPL-MCNC: 175 MG/DL

## 2023-10-23 PROCEDURE — 80061 LIPID PANEL: CPT

## 2023-10-23 PROCEDURE — 80048 BASIC METABOLIC PNL TOTAL CA: CPT

## 2023-10-23 PROCEDURE — 36415 COLL VENOUS BLD VENIPUNCTURE: CPT

## 2023-11-09 ENCOUNTER — TELEPHONE (OUTPATIENT)
Dept: FAMILY MEDICINE | Facility: OTHER | Age: 68
End: 2023-11-09

## 2023-11-09 ENCOUNTER — OFFICE VISIT (OUTPATIENT)
Dept: FAMILY MEDICINE | Facility: OTHER | Age: 68
End: 2023-11-09
Payer: MEDICARE

## 2023-11-09 VITALS
OXYGEN SATURATION: 93 % | HEART RATE: 80 BPM | DIASTOLIC BLOOD PRESSURE: 88 MMHG | HEIGHT: 65 IN | TEMPERATURE: 97.3 F | WEIGHT: 258 LBS | BODY MASS INDEX: 42.99 KG/M2 | RESPIRATION RATE: 20 BRPM | SYSTOLIC BLOOD PRESSURE: 110 MMHG

## 2023-11-09 DIAGNOSIS — Z01.818 PREOP GENERAL PHYSICAL EXAM: Primary | ICD-10-CM

## 2023-11-09 DIAGNOSIS — H25.9 SENILE CATARACT OF LEFT EYE, UNSPECIFIED AGE-RELATED CATARACT TYPE: ICD-10-CM

## 2023-11-09 DIAGNOSIS — L30.9 ECZEMA, UNSPECIFIED TYPE: ICD-10-CM

## 2023-11-09 DIAGNOSIS — R73.09 ELEVATED GLUCOSE: ICD-10-CM

## 2023-11-09 DIAGNOSIS — E78.5 HYPERLIPIDEMIA LDL GOAL <100: ICD-10-CM

## 2023-11-09 DIAGNOSIS — E66.01 MORBID OBESITY (H): ICD-10-CM

## 2023-11-09 LAB — HBA1C MFR BLD: 5.7 % (ref 0–5.6)

## 2023-11-09 PROCEDURE — 99204 OFFICE O/P NEW MOD 45 MIN: CPT | Performed by: PHYSICIAN ASSISTANT

## 2023-11-09 PROCEDURE — 83036 HEMOGLOBIN GLYCOSYLATED A1C: CPT | Performed by: PHYSICIAN ASSISTANT

## 2023-11-09 PROCEDURE — 36415 COLL VENOUS BLD VENIPUNCTURE: CPT | Performed by: PHYSICIAN ASSISTANT

## 2023-11-09 RX ORDER — POTASSIUM CHLORIDE 1500 MG/1
20 TABLET, EXTENDED RELEASE ORAL
COMMUNITY

## 2023-11-09 RX ORDER — TRIAMCINOLONE ACETONIDE 1 MG/G
CREAM TOPICAL 2 TIMES DAILY PRN
Qty: 45 G | Refills: 3 | Status: SHIPPED | OUTPATIENT
Start: 2023-11-09

## 2023-11-09 RX ORDER — ACETAMINOPHEN 500 MG
500-1000 TABLET ORAL EVERY 6 HOURS PRN
COMMUNITY

## 2023-11-09 RX ORDER — RESPIRATORY SYNCYTIAL VIRUS VACCINE 120MCG/0.5
0.5 KIT INTRAMUSCULAR ONCE
Qty: 1 EACH | Refills: 0 | Status: CANCELLED | OUTPATIENT
Start: 2023-11-09 | End: 2023-11-09

## 2023-11-09 ASSESSMENT — PAIN SCALES - GENERAL: PAINLEVEL: NO PAIN (0)

## 2023-11-09 NOTE — LETTER
November 9, 2023      Mirtha Marroquin  [address]  [place] [address]        Dear ,    We are writing to inform you of your test results. Your A1C is borderline right between normal and pre-diabetes. Continue with plan for exercise and weight loss. Recheck this again next year.         Resulted Orders   Hemoglobin A1c   Result Value Ref Range    Hemoglobin A1C 5.7 (H) 0.0 - 5.6 %      Comment:      Normal <5.7%   Prediabetes 5.7-6.4%    Diabetes 6.5% or higher     Note: Adopted from ADA consensus guidelines.       If you have any questions or concerns, please call the clinic at the number listed above.       Sincerely,      Jeni Heck PA-C

## 2023-11-09 NOTE — RESULT ENCOUNTER NOTE
Please mail lab    A1C borderline right between normal and pre-diabetes. She should continue with plan for exercise and weight loss. Recheck this again next year.     Jamie Heck PA-C

## 2023-11-09 NOTE — PATIENT INSTRUCTIONS
- 2x week for 20 min   - For 1 month, then increase either duration or times per week        3x/week for 20 min or 30 min twice a week     Preparing for Your Surgery  Getting started  A nurse will call you to review your health history and instructions. They will give you an arrival time based on your scheduled surgery time. Please be ready to share:  Your doctor's clinic name and phone number  Your medical, surgical, and anesthesia history  A list of allergies and sensitivities  A list of medicines, including herbal treatments and over-the-counter drugs  Whether the patient has a legal guardian (ask how to send us the papers in advance)  Please tell us if you're pregnant--or if there's any chance you might be pregnant. Some surgeries may injure a fetus (unborn baby), so they require a pregnancy test. Surgeries that are safe for a fetus don't always need a test, and you can choose whether to have one.   If you have a child who's having surgery, please ask for a copy of Preparing for Your Child's Surgery.    Preparing for surgery  Within 10 to 30 days of surgery: Have a pre-op exam (sometimes called an H&P, or History and Physical). This can be done at a clinic or pre-operative center.  If you're having a , you may not need this exam. Talk to your care team.  At your pre-op exam, talk to your care team about all medicines you take. If you need to stop any medicines before surgery, ask when to start taking them again.  We do this for your safety. Many medicines can make you bleed too much during surgery. Some change how well surgery (anesthesia) drugs work.  Call your insurance company to let them know you're having surgery. (If you don't have insurance, call [phone].)  Call your clinic if there's any change in your health. This includes signs of a cold or flu (sore throat, runny nose, cough, rash, fever). It also includes a scrape or scratch near the surgery site.  If you have questions on the day of  surgery, call your hospital or surgery center.  Eating and drinking guidelines  For your safety: Unless your surgeon tells you otherwise, follow the guidelines below.  Eat and drink as usual until 8 hours before you arrive for surgery. After that, no food or milk.  Drink clear liquids until 2 hours before you arrive. These are liquids you can see through, like water, Gatorade, and Propel Water. They also include plain black coffee and tea (no cream or milk), candy, and breath mints. You can spit out gum when you arrive.  If you drink alcohol: Stop drinking it the night before surgery.  If your care team tells you to take medicine on the morning of surgery, it's okay to take it with a sip of water.  Preventing infection  Shower or bathe the night before and morning of your surgery. Follow the instructions your clinic gave you. (If no instructions, use regular soap.)  Don't shave or clip hair near your surgery site. We'll remove the hair if needed.  Don't smoke or vape the morning of surgery. You may chew nicotine gum up to 2 hours before surgery. A nicotine patch is okay.  Note: Some surgeries require you to completely quit smoking and nicotine. Check with your surgeon.  Your care team will make every effort to keep you safe from infection. We will:  Clean our hands often with soap and water (or an alcohol-based hand rub).  Clean the skin at your surgery site with a special soap that kills germs.  Give you a special gown to keep you warm. (Cold raises the risk of infection.)  Wear special hair covers, masks, gowns and gloves during surgery.  Give antibiotic medicine, if prescribed. Not all surgeries need antibiotics.  What to bring on the day of surgery  Photo ID and insurance card  Copy of your health care directive, if you have one  Glasses and hearing aids (bring cases)  You can't wear contacts during surgery  Inhaler and eye drops, if you use them (tell us about these when you arrive)  CPAP machine or breathing  device, if you use them  A few personal items, if spending the night  If you have . . .  A pacemaker, ICD (cardiac defibrillator) or other implant: Bring the ID card.  An implanted stimulator: Bring the remote control.  A legal guardian: Bring a copy of the certified (court-stamped) guardianship papers.  Please remove any jewelry, including body piercings. Leave jewelry and other valuables at home.  If you're going home the day of surgery  You must have a responsible adult drive you home. They should stay with you overnight as well.  If you don't have someone to stay with you, and you aren't safe to go home alone, we may keep you overnight. Insurance often won't pay for this.  After surgery  If it's hard to control your pain or you need more pain medicine, please call your surgeon's office.  Questions?   If you have any questions for your care team, list them here: _________________________________________________________________________________________________________________________________________________________________________ ____________________________________ ____________________________________ ____________________________________  For informational purposes only. Not to replace the advice of your health care provider. Copyright   2003, 2019 [facility]. All rights reserved. Clinically reviewed by Angela Eaton MD. [employer] [id number] - REV 12/22.    How to Take Your Medication Before Surgery  - Take all of your medications before surgery as usual

## 2023-11-09 NOTE — PROGRESS NOTES
[address]  [facility] [address]  Phone: [phone]  Primary Provider: Corrina Heck  Pre-op Performing Provider: CORRINA HECK      PREOPERATIVE EVALUATION:  Today's date: 11/9/2023    Mirtha is a 68 year old female who presents for a preoperative evaluation.      11/9/2023    10:32 AM   Additional Questions   Roomed by Fela   Accompanied by Self         11/9/2023    10:32 AM   Patient Reported Additional Medications   Patient reports taking the following new medications See list of suppliments patient provided       Surgical Information:  Surgery/Procedure: Lt eye cataract surgery  Surgery Location: [facility], [place]  Surgeon: Dr. Michael Rojas  Surgery Date: 12/7/23  Time of Surgery: TBD  Where patient plans to recover: At home with family  Fax number for surgical facility:  [phone]      Assessment & Plan     The proposed surgical procedure is considered LOW risk.      ICD-10-CM    1. Preop general physical exam  Z01.818       2. Senile cataract of left eye, unspecified age-related cataract type  H25.9       3. Hyperlipidemia LDL goal <100  E78.5       4. Eczema, unspecified type  L30.9 triamcinolone (KENALOG) 0.1 % external cream      5. Elevated glucose  R73.09 Hemoglobin A1c     Hemoglobin A1c      6. Morbid obesity (H)  E66.01          - No identified additional risk factors other than previously addressed    Antiplatelet or Anticoagulation Medication Instructions:   - Patient is on no antiplatelet or anticoagulation medications.    Additional Medication Instructions:  Patient is on no additional chronic medications    RECOMMENDATION:  APPROVAL GIVEN to proceed with proposed procedure, without further diagnostic evaluation.    Additional Concerns:     Lipids   - Borderline in the past, not on medication, has gained weight over the last year   - Reviewed recent labs   - ASCVD 6.8%, discussed  almost needs medication   - She will work on diet and exercise      2. Elevated glucose   - New with recent labs at 111   - Recommend getting A1c to assess for prediabetes/diabetes   - Await results     3. Eczema   - Trial of Kenalog cream   - Reviewed use and side effects     Review of the result(s) of each unique test - See list       10/23/23 - Lipid, BMP   40 minutes spent by me on the date of the encounter doing chart review, history and exam, documentation and further activities per the note    Signed Electronically by: Jeni Dinero-ELIAS Partida  Copy of this evaluation report is provided to requesting physician.      Subjective   HPI related to upcoming procedure: She is scheduled for left eye cataract surgery. She had right eye cataract surgery when she was 29. She is extremely farsighted and extremely nearsighted.     Presenting for left eye cataract removal due to decreased vision       Additional concerns   Ears   She has eczema in her ears. They itch and get scaly. She keeps flicking off the skin. She is getting dry patches on different parts of her body     She is on no medications other than her supplements. She does not take vitamin D, but takes calcium, potassium, and turmeric. She takes an 8-hour generic time-release Tylenol Arthritis 2 in the morning and 2 at night. She takes Flonase occasionally when she gets congested because she has always had trouble with her ears.      She takes a natural bony hair growth because her hair is very thin. She takes ThoughtBuzz I Function once a day. She takes 2 teaspoons of sugar-free Metamucil every day instead of taking a probiotic.     She rides an exercise bike to help with her weight. She is fine walking, but she gets out of breath. She was cutting the grass and bagging it on Sunday and was fine for a while. When she starts to feel short of breath, she takes a break to catch her breath and then goes back out and does more.     She takes  generic Zyrtec every day. She rides her breath off and on. She has not gotten on her exercise bike in a while. She does not want to go for walks until she gets her breath better. She has never had any heart checks. She wonders if her shortness of breath is due to her weight gain. She denies a history of heart failure. She was told to take potassium and iron when she had her stomach surgery. She always takes iron with the supplement and potassium 1 a day. She denies relatives with problems with anesthesia. She does not take ibuprofen anymore because she gets black and blue if she takes too much of it.       The 10-year ASCVD risk score (Epifanio WESTBROOK, et al., 2019) is: 6.8%    Values used to calculate the score:      Age: 68 years      Sex: Female      Is Non- : No      Diabetic: No      Tobacco smoker: No      Systolic Blood Pressure: 110 mmHg      Is BP treated: No      HDL Cholesterol: 61 mg/dL      Total Cholesterol: 258 mg/dL          11/9/2023    10:08 AM   Preop Questions   1. Have you ever had a heart attack or stroke? No   2. Have you ever had surgery on your heart or blood vessels, such as a stent placement, a coronary artery bypass, or surgery on an artery in your head, neck, heart, or legs? No   3. Do you have chest pain with activity? No   4. Do you have a history of  heart failure? No   5. Do you currently have a cold, bronchitis or symptoms of other infection? No   6. Do you have a cough, shortness of breath, or wheezing? No   7. Do you or anyone in your family have previous history of blood clots? No   8. Do you or does anyone in your family have a serious bleeding problem such as prolonged bleeding following surgeries or cuts? No   9. Have you ever had problems with anemia or been told to take iron pills? Yes, when had stomach stapled, still takes supplement    10. Have you had any abnormal blood loss such as black, tarry or bloody stools, or abnormal vaginal bleeding? No   11.  Have you ever had a blood transfusion? No   12. Are you willing to have a blood transfusion if it is medically needed before, during, or after your surgery? Yes   13. Have you or any of your relatives ever had problems with anesthesia? No   14. Do you have sleep apnea, excessive snoring or daytime drowsiness? No   15. Do you have any artifical heart valves or other implanted medical devices like a pacemaker, defibrillator, or continuous glucose monitor? No   16. Do you have artificial joints? No   17. Are you allergic to latex? No     Health Care Directive:  Patient does not have a Health Care Directive or Living Will: Discussed advance care planning with patient; however, patient declined at this time.    Preoperative Review of :   reviewed - no record of controlled substances prescribed.    Status of Chronic Conditions:  See problem list for active medical problems.  Problems all longstanding and stable, except as noted/documented.  See ROS for pertinent symptoms related to these conditions.    Review of Systems  Constitutional, neuro, ENT, endocrine, pulmonary, cardiac, gastrointestinal, genitourinary, musculoskeletal, integument and psychiatric systems are negative, except as otherwise noted.    Patient Active Problem List    Diagnosis Date Noted    Elevated glucose 11/09/2023     Priority: Medium    Rotator cuff arthropathy of left shoulder 04/17/2019     Priority: Medium    Rotator cuff tear arthropathy of right shoulder 05/16/2018     Priority: Medium    BMI >35 (H) 10/16/2017     Priority: Medium    Osteopenia 12/27/2010     Priority: Medium    Hyperlipidemia LDL goal <100 10/31/2010     Priority: Medium    Intestinal bypass or anastomosis status 09/14/2004     Priority: Medium    Chronic rhinitis 07/18/2003     Priority: Medium      Past Medical History:   Diagnosis Date    Abnormal weight gain 10/2009    Absence of menstruation 2002    LMP 2002    Chronic rhinitis     Dyspepsia and other specified  disorders of function of stomach 2006    Osteopenia     Pure hypercholesterolemia     worse     Tobacco use disorder     quit      Past Surgical History:   Procedure Laterality Date    C DEXA INTERPRETATION, AXIAL  10/2004    WNL 2007 min change    COLONOSCOPY  07    [facility]-repeat in 5yrs    COLONOSCOPY  12    [facility] REMV CATARACT EXTRACAP,INSERT LENS, W/O ECP      [facility] NONSPECIFIC PROCEDURE  1974    [facility]  10/[facility]    tubular adenoma - repeat 3 years per pt (?)     Current Outpatient Medications   Medication Sig Dispense Refill    acetaminophen (TYLENOL) 500 MG tablet Take 500-1,000 mg by mouth every 6 hours as needed for mild pain      FISH OIL       GLUCOSAMINE SULFATE PO       Iron 25 MG TABS Take  by mouth.      LUTEIN PO       MAGNESIUM OXIDE PO       MULTI-VITAMIN OR TABS   0    potassium chloride ER (K-TAB) 20 MEQ CR tablet Take 20 mEq by mouth      triamcinolone (KENALOG) 0.1 % external cream Apply topically 2 times daily as needed for irritation (Not more than 2 weeks) 45 g 3    Turmeric (QC TUMERIC COMPLEX PO)       VITAMIN B COMPLEX OR One daily      VITAMIN D OR one daily      ZYRTEC 10 MG OR TABS ONE TABLET DAILY 0 0       Allergies   Allergen Reactions    No Known Drug Allergy         Social History     Tobacco Use    Smoking status: Former     Packs/day: 2.00     Years: 20.00     Additional pack years: 0.00     Total pack years: 40.00     Types: Cigarettes     Start date: 1976     Quit date: 2009     Years since quittin.2    Smokeless tobacco: Never   Substance Use Topics    Alcohol use: Yes     Comment: not much, beer once a month     Family History   Problem Relation Age of Onset    Diabetes Mother     Hypertension Mother     Cancer Mother         cervical    Cerebrovascular Disease Mother     Cancer - colorectal Paternal Grandfather     Cancer - colorectal Paternal Aunt     Heart Disease Maternal  "Grandfather         heart attack    Heart Disease Sister         heart surgery birth defect    Respiratory Father          of pneumonia age 64       History   Drug Use No         Objective     /88   Pulse 80   Temp 97.3  F (36.3  C) (Temporal)   Resp 20   Ht 1.65 m (5' 4.96\")   Wt 117 kg (258 lb)   LMP 03/15/2001   SpO2 93%   BMI 42.99 kg/m      Physical Exam  GENERAL APPEARANCE: healthy, alert and no distress  HENT: ear canals dry with scaling, TM's normal and nose and mouth without ulcers or lesions  RESP: lungs clear to auscultation - no rales, rhonchi or wheezes  CV: regular rate and rhythm, normal S1 S2, no S3 or S4 and no murmur, click or rub   NEURO: Mentation intact and speech normal    Recent Labs   Lab Test 10/23/23  1018 21  1004    138   POTASSIUM 4.6 4.6   CR 0.89 0.78        Diagnostics:  No labs were ordered during this visit.     No EKG required for low risk surgery (cataract, skin procedure, breast biopsy, etc).    Revised Cardiac Risk Index (RCRI):  The patient has the following serious cardiovascular risks for perioperative complications:   - No serious cardiac risks = 0 points     RCRI Interpretation: 0 points: Class I (very low risk - 0.4% complication rate)           "

## 2023-11-09 NOTE — TELEPHONE ENCOUNTER
Please fax pre-op    Jamie Dinero-ELIAS Partida  [facility] [place]

## 2024-03-22 ENCOUNTER — TELEPHONE (OUTPATIENT)
Dept: FAMILY MEDICINE | Facility: OTHER | Age: 69
End: 2024-03-22
Payer: MEDICARE

## 2024-03-22 NOTE — TELEPHONE ENCOUNTER
Patient Quality Outreach    Patient is due for the following:   Physical Annual Wellness Visit    Next Steps:   Schedule a Annual Wellness Visit    Type of outreach:    Sent letter.      Questions for provider review:    None           Jessica Zhu, [facility]

## 2024-10-03 ENCOUNTER — TELEPHONE (OUTPATIENT)
Dept: FAMILY MEDICINE | Facility: OTHER | Age: 69
End: 2024-10-03
Payer: MEDICARE

## 2024-10-03 DIAGNOSIS — E78.5 HYPERLIPIDEMIA LDL GOAL <100: Primary | ICD-10-CM

## 2024-10-03 DIAGNOSIS — R73.09 ELEVATED GLUCOSE: ICD-10-CM

## 2024-10-03 NOTE — TELEPHONE ENCOUNTER
Order/Referral Request    Who is requesting: Patient    Orders being requested: LABS    Annual labs  Cholesterol   Pre-diabetic     Reason service is needed/diagnosis: Annual Labs    When are orders needed by: 10/10/24    Has this been discussed with Provider: No    Does patient have a preference on a Group/Provider/Facility? [facility]    Does patient have an appointment scheduled?: Yes: 10/10/24 - Lab appt  11/19/24 - Annual WC    Where to send orders: Place orders within Epic    Okay to leave a detailed message?: Yes at Home number on file [phone] (home)

## 2024-10-10 ENCOUNTER — ANCILLARY PROCEDURE (OUTPATIENT)
Dept: MAMMOGRAPHY | Facility: OTHER | Age: 69
End: 2024-10-10
Payer: MEDICARE

## 2024-10-10 ENCOUNTER — LAB (OUTPATIENT)
Dept: LAB | Facility: OTHER | Age: 69
End: 2024-10-10
Payer: MEDICARE

## 2024-10-10 DIAGNOSIS — E78.5 HYPERLIPIDEMIA LDL GOAL <100: ICD-10-CM

## 2024-10-10 DIAGNOSIS — Z12.31 VISIT FOR SCREENING MAMMOGRAM: ICD-10-CM

## 2024-10-10 DIAGNOSIS — R73.09 ELEVATED GLUCOSE: ICD-10-CM

## 2024-10-10 LAB
ANION GAP SERPL CALCULATED.3IONS-SCNC: 12 MMOL/L (ref 7–15)
BUN SERPL-MCNC: 14.5 MG/DL (ref 8–23)
CALCIUM SERPL-MCNC: 10 MG/DL (ref 8.8–10.4)
CHLORIDE SERPL-SCNC: 101 MMOL/L (ref 98–107)
CHOLEST SERPL-MCNC: 266 MG/DL
CREAT SERPL-MCNC: 0.86 MG/DL (ref 0.51–0.95)
EGFRCR SERPLBLD CKD-EPI 2021: 72 ML/MIN/1.73M2
EST. AVERAGE GLUCOSE BLD GHB EST-MCNC: 120 MG/DL
FASTING STATUS PATIENT QL REPORTED: YES
FASTING STATUS PATIENT QL REPORTED: YES
GLUCOSE SERPL-MCNC: 111 MG/DL (ref 70–99)
HBA1C MFR BLD: 5.8 % (ref 0–5.6)
HCO3 SERPL-SCNC: 25 MMOL/L (ref 22–29)
HDLC SERPL-MCNC: 64 MG/DL
LDLC SERPL CALC-MCNC: 165 MG/DL
NONHDLC SERPL-MCNC: 202 MG/DL
POTASSIUM SERPL-SCNC: 4.4 MMOL/L (ref 3.4–5.3)
SODIUM SERPL-SCNC: 138 MMOL/L (ref 135–145)
TRIGL SERPL-MCNC: 186 MG/DL

## 2024-10-10 PROCEDURE — 77063 BREAST TOMOSYNTHESIS BI: CPT | Mod: TC | Performed by: RADIOLOGY

## 2024-10-10 PROCEDURE — 36415 COLL VENOUS BLD VENIPUNCTURE: CPT

## 2024-10-10 PROCEDURE — 80061 LIPID PANEL: CPT

## 2024-10-10 PROCEDURE — 80048 BASIC METABOLIC PNL TOTAL CA: CPT

## 2024-10-10 PROCEDURE — 77067 SCR MAMMO BI INCL CAD: CPT | Mod: TC | Performed by: RADIOLOGY

## 2024-10-10 PROCEDURE — 83036 HEMOGLOBIN GLYCOSYLATED A1C: CPT

## 2024-10-10 NOTE — LETTER
October 10, 2024      Mirtha Marroquin  [address] [place] [id number]                Dear ,    We are writing to inform you of your test results.    Your labs are stable, we will discuss these at your next visit.    Resulted Orders   Hemoglobin A1c   Result Value Ref Range    Estimated Average Glucose 120 (H) <117 mg/dL    Hemoglobin A1C 5.8 (H) 0.0 - 5.6 %      Comment:      Normal <5.7%   Prediabetes 5.7-6.4%    Diabetes 6.5% or higher     Note: Adopted from ADA consensus guidelines.   Lipid panel reflex to direct LDL Fasting   Result Value Ref Range    Cholesterol 266 (H) <200 mg/dL    Triglycerides 186 (H) <150 mg/dL    Direct Measure HDL 64 >=50 mg/dL    LDL Cholesterol Calculated 165 (H) <100 mg/dL    Non HDL Cholesterol 202 (H) <130 mg/dL    Patient Fasting > 8hrs? Yes     Narrative    Cholesterol  Desirable: < 200 mg/dL  Borderline High: 200 - 239 mg/dL  High: >= 240 mg/dL    Triglycerides  Normal: < 150 mg/dL  Borderline High: 150 - 199 mg/dL  High: 200-499 mg/dL  Very High: >= 500 mg/dL    Direct Measure HDL  Female: >= 50 mg/dL   Male: >= 40 mg/dL    LDL Cholesterol  Desirable: < 100 mg/dL  Above Desirable: 100 - 129 mg/dL   Borderline High: 130 - 159 mg/dL   High:  160 - 189 mg/dL   Very High: >= 190 mg/dL    Non HDL Cholesterol  Desirable: < 130 mg/dL  Above Desirable: 130 - 159 mg/dL  Borderline High: 160 - 189 mg/dL  High: 190 - 219 mg/dL  Very High: >= 220 mg/dL   Basic metabolic panel  (Ca, Cl, CO2, Creat, Gluc, K, Na, BUN)   Result Value Ref Range    Sodium 138 135 - 145 mmol/L    Potassium 4.4 3.4 - 5.3 mmol/L    Chloride 101 98 - 107 mmol/L    Carbon Dioxide (CO2) 25 22 - 29 mmol/L    Anion Gap 12 7 - 15 mmol/L    Urea Nitrogen 14.5 8.0 - 23.0 mg/dL    Creatinine 0.86 0.51 - 0.95 mg/dL    GFR Estimate 72 >60 mL/min/1.73m2      Comment:      eGFR calculated using 2021 CKD-EPI equation.    Calcium 10.0 8.8 - 10.4 mg/dL      Comment:      Reference intervals for this test were  updated on 7/16/2024 to reflect our healthy population more accurately. There may be differences in the flagging of prior results with similar values performed with this method. Those prior results can be interpreted in the context of the updated reference intervals.    Glucose 111 (H) 70 - 99 mg/dL    Patient Fasting > 8hrs? Yes        If you have any questions or concerns, please call the clinic at the number listed above.       Sincerely,      Jeni Heck PA-C

## 2024-10-10 NOTE — RESULT ENCOUNTER NOTE
Please call patient with the following message    Please mail stable labs. Will discuss at next visit.     Jamie Heck PA-C